# Patient Record
Sex: MALE | Race: AMERICAN INDIAN OR ALASKA NATIVE
[De-identification: names, ages, dates, MRNs, and addresses within clinical notes are randomized per-mention and may not be internally consistent; named-entity substitution may affect disease eponyms.]

---

## 2019-05-27 ENCOUNTER — HOSPITAL ENCOUNTER (INPATIENT)
Dept: HOSPITAL 5 - ED | Age: 74
LOS: 8 days | Discharge: HOSPICE | DRG: 54 | End: 2019-06-04
Attending: INTERNAL MEDICINE | Admitting: INTERNAL MEDICINE
Payer: MEDICARE

## 2019-05-27 DIAGNOSIS — I10: ICD-10-CM

## 2019-05-27 DIAGNOSIS — J44.9: ICD-10-CM

## 2019-05-27 DIAGNOSIS — G93.40: ICD-10-CM

## 2019-05-27 DIAGNOSIS — G81.91: ICD-10-CM

## 2019-05-27 DIAGNOSIS — C80.1: ICD-10-CM

## 2019-05-27 DIAGNOSIS — C79.31: Primary | ICD-10-CM

## 2019-05-27 DIAGNOSIS — Z82.49: ICD-10-CM

## 2019-05-27 DIAGNOSIS — G93.6: ICD-10-CM

## 2019-05-27 DIAGNOSIS — R91.1: ICD-10-CM

## 2019-05-27 DIAGNOSIS — D49.6: ICD-10-CM

## 2019-05-27 DIAGNOSIS — F17.200: ICD-10-CM

## 2019-05-27 DIAGNOSIS — F10.10: ICD-10-CM

## 2019-05-27 DIAGNOSIS — G40.89: ICD-10-CM

## 2019-05-27 DIAGNOSIS — M89.8X8: ICD-10-CM

## 2019-05-27 DIAGNOSIS — Z87.820: ICD-10-CM

## 2019-05-27 DIAGNOSIS — Z71.6: ICD-10-CM
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
## 2019-05-27 DIAGNOSIS — Z82.3: ICD-10-CM

## 2019-05-27 DIAGNOSIS — Z83.3: ICD-10-CM

## 2019-05-27 DIAGNOSIS — Z23: ICD-10-CM

## 2019-05-27 DIAGNOSIS — Z51.5: ICD-10-CM

## 2019-05-27 LAB
ANISOCYTOSIS BLD QL SMEAR: (no result)
APTT BLD: 36 SEC. (ref 24.2–36.6)
BAND NEUTROPHILS # (MANUAL): 0 K/MM3
BUN SERPL-MCNC: 14 MG/DL (ref 9–20)
BUN/CREAT SERPL: 18 %
CALCIUM SERPL-MCNC: 9.5 MG/DL (ref 8.4–10.2)
HCT VFR BLD CALC: 39.8 % (ref 35.5–45.6)
HEMOLYSIS INDEX: 51
HGB BLD-MCNC: 13.1 GM/DL (ref 11.8–15.2)
INR PPP: 1.06 (ref 0.87–1.13)
MCHC RBC AUTO-ENTMCNC: 33 % (ref 32–34)
MCV RBC AUTO: 81 FL (ref 84–94)
MYELOCYTES # (MANUAL): 0 K/MM3
PLATELET # BLD: 437 K/MM3 (ref 140–440)
PROMYELOCYTES # (MANUAL): 0 K/MM3
RBC # BLD AUTO: 4.92 M/MM3 (ref 3.65–5.03)
TOTAL CELLS COUNTED BLD: 100

## 2019-05-27 PROCEDURE — 90471 IMMUNIZATION ADMIN: CPT

## 2019-05-27 PROCEDURE — 90732 PPSV23 VACC 2 YRS+ SUBQ/IM: CPT

## 2019-05-27 PROCEDURE — 71046 X-RAY EXAM CHEST 2 VIEWS: CPT

## 2019-05-27 PROCEDURE — 80061 LIPID PANEL: CPT

## 2019-05-27 PROCEDURE — 85025 COMPLETE CBC W/AUTO DIFF WBC: CPT

## 2019-05-27 PROCEDURE — 85670 THROMBIN TIME PLASMA: CPT

## 2019-05-27 PROCEDURE — 93306 TTE W/DOPPLER COMPLETE: CPT

## 2019-05-27 PROCEDURE — 93880 EXTRACRANIAL BILAT STUDY: CPT

## 2019-05-27 PROCEDURE — G0008 ADMIN INFLUENZA VIRUS VAC: HCPCS

## 2019-05-27 PROCEDURE — 85730 THROMBOPLASTIN TIME PARTIAL: CPT

## 2019-05-27 PROCEDURE — 80048 BASIC METABOLIC PNL TOTAL CA: CPT

## 2019-05-27 PROCEDURE — 80053 COMPREHEN METABOLIC PANEL: CPT

## 2019-05-27 PROCEDURE — 85007 BL SMEAR W/DIFF WBC COUNT: CPT

## 2019-05-27 PROCEDURE — 87040 BLOOD CULTURE FOR BACTERIA: CPT

## 2019-05-27 PROCEDURE — 83036 HEMOGLOBIN GLYCOSYLATED A1C: CPT

## 2019-05-27 PROCEDURE — 93005 ELECTROCARDIOGRAM TRACING: CPT

## 2019-05-27 PROCEDURE — 90686 IIV4 VACC NO PRSV 0.5 ML IM: CPT

## 2019-05-27 PROCEDURE — A9577 INJ MULTIHANCE: HCPCS

## 2019-05-27 PROCEDURE — 70544 MR ANGIOGRAPHY HEAD W/O DYE: CPT

## 2019-05-27 PROCEDURE — G0009 ADMIN PNEUMOCOCCAL VACCINE: HCPCS

## 2019-05-27 PROCEDURE — 85610 PROTHROMBIN TIME: CPT

## 2019-05-27 PROCEDURE — 82378 CARCINOEMBRYONIC ANTIGEN: CPT

## 2019-05-27 PROCEDURE — 70551 MRI BRAIN STEM W/O DYE: CPT

## 2019-05-27 PROCEDURE — 99285 EMERGENCY DEPT VISIT HI MDM: CPT

## 2019-05-27 PROCEDURE — 93010 ELECTROCARDIOGRAM REPORT: CPT

## 2019-05-27 PROCEDURE — 70553 MRI BRAIN STEM W/O & W/DYE: CPT

## 2019-05-27 PROCEDURE — 86301 IMMUNOASSAY TUMOR CA 19-9: CPT

## 2019-05-27 PROCEDURE — 84484 ASSAY OF TROPONIN QUANT: CPT

## 2019-05-27 PROCEDURE — 82140 ASSAY OF AMMONIA: CPT

## 2019-05-27 PROCEDURE — 71260 CT THORAX DX C+: CPT

## 2019-05-27 PROCEDURE — 70450 CT HEAD/BRAIN W/O DYE: CPT

## 2019-05-27 PROCEDURE — 82962 GLUCOSE BLOOD TEST: CPT

## 2019-05-27 PROCEDURE — 3E0234Z INTRODUCTION OF SERUM, TOXOID AND VACCINE INTO MUSCLE, PERCUTANEOUS APPROACH: ICD-10-PCS | Performed by: INTERNAL MEDICINE

## 2019-05-27 PROCEDURE — 36415 COLL VENOUS BLD VENIPUNCTURE: CPT

## 2019-05-27 PROCEDURE — 96374 THER/PROPH/DIAG INJ IV PUSH: CPT

## 2019-05-27 RX ADMIN — DOCUSATE SODIUM SCH MG: 100 CAPSULE, LIQUID FILLED ORAL at 22:55

## 2019-05-27 RX ADMIN — ASPIRIN SCH MG: 325 TABLET ORAL at 10:17

## 2019-05-27 RX ADMIN — CEFTRIAXONE SODIUM SCH MLS/HR: 1 INJECTION, POWDER, FOR SOLUTION INTRAMUSCULAR; INTRAVENOUS at 10:17

## 2019-05-27 RX ADMIN — DOCUSATE SODIUM SCH MG: 100 CAPSULE, LIQUID FILLED ORAL at 10:17

## 2019-05-27 RX ADMIN — ENOXAPARIN SODIUM SCH MG: 100 INJECTION SUBCUTANEOUS at 10:17

## 2019-05-27 RX ADMIN — ENOXAPARIN SODIUM SCH MG: 100 INJECTION SUBCUTANEOUS at 22:55

## 2019-05-27 RX ADMIN — Medication SCH MG: at 17:55

## 2019-05-27 RX ADMIN — LEVETIRACETAM SCH MG: 500 TABLET, FILM COATED ORAL at 22:55

## 2019-05-27 RX ADMIN — Medication SCH ML: at 22:55

## 2019-05-27 RX ADMIN — Medication SCH ML: at 10:31

## 2019-05-27 NOTE — PROGRESS NOTE
Assessment and Plan


Assessment and plan: 


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 

dependency and alcohol abuse who presents to [facility] ED with c/o altered mental 

status and new right-sided weakness. Old deficits was loss of hear and abnormal 

speech but no motor deficits per nishweta Montgomery at bedside





* EKG: image reviewed (sinus rhythm 79 bpm)


* CT head without contrast Impression: Large area of hypoattenuation identified 

  in the upper left frontal and temporal lobes, this surrounds a few areas of 

  rounded increased attenuation in the high convexity of the posterior left 

  frontal and posterior left temporal lobes. The findings may indicate sequelae 

  from subacute to chronic infarction. The differential also includes primary 

  metastatic tumor in this region of the left cerebral hemisphere. Further 

  evaluation with advanced cross-sectional imaging utilizing MRI would be 

  appropriate. There is mild atrophy and slight periventricular deep white 

  matter change. There is no evidence of acute hemorrhage or hematoma. 


* 2v CXR impression: Left lung opacity concerning for a mass. Further evaluation

  with CT chest with contrast is recommended. 





Suspected Acute CVA with stroke in evolution vs Brain mets: treat with asa, 

statin, MRI brain pending 


Lung mass suspected Lung cancer with Mets: get CT chest with contrast and 

consulted Pulmonology


Hypertension: low salt diet


Leukocytosis: empiric iv rocephin


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


Hx of EtOH abuse


Hx of Tobacco dependency:  on stopping


DVT prophylaxis on Lovenox





d/w Gertrude x [phone] (niece) who works here, in our lab department


CT chest with contrast, brain MRI, carotid u/s and ECHO pending. 








History


Interval history: 


Patient was seen and examined. Follow-up on current diagnosis of suspected CVA. 

No overnight events reported to me. Patient is nonverbal. Niece Valentina at 

bedside. He has no spouse, no kids. His niece Gertrude, works here in the lab 

[phone]. Imaging, nursing note, chart, labs and old chart reviewed. Discussed with

patient. 








Gen: thin frail, NAD, Awake, Alert, not taking sensible


HEENT: NCAT, EOMI, PERRL, OP Clear with missing teeth and poor dentition


Neck: supple, no adenopathy, no thyromegaly, no JVD 


CVS/Heart: RRR, normal S1S2, pulses present bilaterally 


Chest/Lungs: CTA B, Symmetrical chest expansion, good air entry bilaterally 


GI/Abdomen: soft, NTND, good bowel sounds, no guarding or rebound 


/Bladder: no suprapubic tenderness, no CVA or paraspinal tenderness 


Extermity/Skin: no c/c/e, no obvious rash 


MSK: FROM x 4, 3/5 msk strength right arm and right leg


Neuro: CN 2-12 grossly intact except hearing and speech with new right 

hemiparesis. No drift 


Psych: calm 














Hospitalist Physical





- Constitutional


Vitals: 


                                        











Temp Pulse Resp BP Pulse Ox


 


 98.8 F   68   18   112/73   97 


 


 05/27/19 08:37  05/27/19 08:37  05/27/19 08:37  05/27/19 08:37  05/27/19 08:37














Results





- Labs


CBC & Chem 7: 


                                 05/27/19 00:58





                                 05/27/19 00:58


Labs: 


                             Laboratory Last Values











WBC  17.7 K/mm3 (4.5-11.0)  H  05/27/19  00:58    


 


RBC  4.92 M/mm3 (3.65-5.03)   05/27/19  00:58    


 


Hgb  13.1 gm/dl (11.8-15.2)   05/27/19  00:58    


 


Hct  39.8 % (35.5-45.6)   05/27/19  00:58    


 


MCV  81 fl (84-94)  L  05/27/19  00:58    


 


MCH  27 pg (28-32)  L  05/27/19  00:58    


 


MCHC  33 % (32-34)   05/27/19  00:58    


 


RDW  14.1 % (13.2-15.2)   05/27/19  00:58    


 


Plt Count  437 K/mm3 (140-440)   05/27/19  00:58    


 


Lymph #  Np   05/27/19  00:58    


 


Add Manual Diff  Complete   05/27/19  00:58    


 


Total Counted  100   05/27/19  00:58    


 


Seg Neuts % (Manual)  66.0 % (40.0-70.0)   05/27/19  00:58    


 


  0 %  05/27/19  00:58    


 


  25.0 % (13.4-35.0)   05/27/19  00:58    


 


Reactive Lymphs % (Man)  0 %  05/27/19  00:58    


 


  6.0 % (0.0-7.3)   05/27/19  00:58    


 


  3.0 % (0.0-4.3)   05/27/19  00:58    


 


  0 % (0.0-1.8)   05/27/19  00:58    


 


  0 %  05/27/19  00:58    


 


  0 %  05/27/19  00:58    


 


  0 %  05/27/19  00:58    


 


  0 %  05/27/19  00:58    


 


Nucleated RBC %  Not Reportable   05/27/19  00:58    


 


Seg Neutrophils # Man  11.7 K/mm3 (1.8-7.7)  H  05/27/19  00:58    


 


Band Neutrophils #  0.0 K/mm3  05/27/19  00:58    


 


  4.4 K/mm3 (1.2-5.4)   05/27/19  00:58    


 


Abs React Lymphs (Man)  0.0 K/mm3  05/27/19  00:58    


 


  1.1 K/mm3 (0.0-0.8)  H  05/27/19  00:58    


 


  0.5 K/mm3 (0.0-0.4)  H  05/27/19  00:58    


 


  0.0 K/mm3 (0.0-0.1)   05/27/19  00:58    


 


  0.0 K/mm3  05/27/19  00:58    


 


  0.0 K/mm3  05/27/19  00:58    


 


  0.0 K/mm3  05/27/19  00:58    


 


Blast Cells #  0.0 K/mm3  05/27/19  00:58    


 


WBC Morphology  Not Reportable   05/27/19  00:58    


 


WBC Morphology  TNR   05/27/19  00:58    


 


Hypersegmented Neuts  Not Reportable   05/27/19  00:58    


 


Hyposegmented Neuts  Not Reportable   05/27/19  00:58    


 


Hypogranular Neuts  Not Reportable   05/27/19  00:58    


 


  Not Reportable   05/27/19  00:58    


 


  Not Reportable   05/27/19  00:58    


 


  Not Reportable   05/27/19  00:58    


 


  Not Reportable   05/27/19  00:58    


 


  Not Reportable   05/27/19  00:58    


 


  Not Reportable   05/27/19  00:58    


 


  Consistent w auto   05/27/19  00:58    


 


  Not Reportable   05/27/19  00:58    


 


Plt Clumps, EDTA  Not Reportable   05/27/19  00:58    


 


  Not Reportable   05/27/19  00:58    


 


  Not Reportable   05/27/19  00:58    


 


  Not Reportable   05/27/19  00:58    


 


Plt Morphology Comment  Not Reportable   05/27/19  00:58    


 


RBC Morphology  Not Reportable   05/27/19  00:58    


 


Dimorphic RBCs  Not Reportable   05/27/19  00:58    


 


  Not Reportable   05/27/19  00:58    


 


  Not Reportable   05/27/19  00:58    


 


  Not Reportable   05/27/19  00:58    


 


  1+   05/27/19  00:58    


 


  Not Reportable   05/27/19  00:58    


 


  Not Reportable   05/27/19  00:58    


 


  Not Reportable   05/27/19  00:58    


 


  Not Reportable   05/27/19  00:58    


 


  Not Reportable   05/27/19  00:58    


 


  Not Reportable   05/27/19  00:58    


 


  Not Reportable   05/27/19  00:58    


 


  Not Reportable   05/27/19  00:58    


 


  Not Reportable   05/27/19  00:58    


 


  Not Reportable   05/27/19  00:58    


 


  Not Reportable   05/27/19  00:58    


 


  Not Reportable   05/27/19  00:58    


 


  Not Reportable   05/27/19  00:58    


 


  Not Reportable   05/27/19  00:58    


 


  Not Reportable   05/27/19  00:58    


 


Acanthocytes (Spur)  Not Reportable   05/27/19  00:58    


 


Rouleaux  Not Reportable   05/27/19  00:58    


 


  Not Reportable   05/27/19  00:58    


 


  Not Reportable   05/27/19  00:58    


 


  Not Reportable   05/27/19  00:58    


 


  Not Reportable   05/27/19  00:58    


 


Hem Pathologist Commnt  No   05/27/19  00:58    


 


PT  14.5 Sec. (12.2-14.9)   05/27/19  00:58    


 


INR  1.06  (0.87-1.13)   05/27/19  00:58    


 


APTT  36.0 Sec. (24.2-36.6)   05/27/19  00:58    


 


  16.6 Sec. (15.1-19.6)   05/27/19  00:58    


 


Sodium  140 mmol/L (137-145)   05/27/19  00:58    


 


Potassium  3.9 mmol/L (3.6-5.0)   05/27/19  00:58    


 


Chloride  101.9 mmol/L ()   05/27/19  00:58    


 


Carbon Dioxide  25 mmol/L (22-30)   05/27/19  00:58    


 


  17 mmol/L  05/27/19  00:58    


 


BUN  14 mg/dL (9-20)   05/27/19  00:58    


 


  0.8 mg/dL (0.8-1.5)   05/27/19  00:58    


 


Estimated GFR  > 60 ml/min  05/27/19  00:58    


 


  18 %  05/27/19  00:58    


 


Glucose  83 mg/dL ()   05/27/19  00:58    


 


POC Glucose  90  ()   05/27/19  08:43    


 


Lactic Acid  1.90 mmol/L (0.7-2.0)   05/27/19  04:35    


 


Calcium  9.5 mg/dL (8.4-10.2)   05/27/19  00:58    


 


  < 0.010 ng/mL (0.00-0.029)   05/27/19  00:58    














Active Medications





- Current Medications


Current Medications: 














Generic Name Dose Route Start Last Admin





  Trade Name Freq  PRN Reason Stop Dose Admin


 


Acetaminophen  650 mg  05/27/19 04:17 





  Tylenol  PO  





  Q4H PRN  





  Pain MILD(1-3)/Fever >100.5/HA  


 


Aspirin  325 mg  05/27/19 10:00  05/27/19 10:17





  Aspirin  PO   325 mg





  QDAY JOSE LUIS   Administration


 


Atorvastatin Calcium  40 mg  05/27/19 22:00 





  Lipitor  PO  





  QHS JOSE LUIS  


 


Docusate Sodium  100 mg  05/27/19 10:00  05/27/19 10:17





  Colace  PO   100 mg





  BID JOSE LUIS   Administration


 


Enoxaparin Sodium  40 mg  05/27/19 06:00  05/27/19 10:17





  Lovenox  SUB-Q   40 mg





  QDAY@2200 JOSE LUIS   Administration


 


Hydralazine HCl  10 mg  05/27/19 04:57 





  Apresoline  IV  





  Q4HR PRN  





  Blood Pressure  


 


Ceftriaxone Sodium  1 gm in 50 mls @ 100 mls/hr  05/27/19 06:16  05/27/19 10:37





  Rocephin/Ns 1 Gm/50 Ml  IV   100 mls/hr





  Q24HR JOSE LUIS   Infusion





  Protocol  


 


Ondansetron HCl  4 mg  05/27/19 04:17 





  Zofran  IV  





  Q8H PRN  





  Nausea And Vomiting  


 


Sodium Chloride  10 ml  05/27/19 10:00  05/27/19 10:31





  Sodium Chloride Flush Syringe 10 Ml  IV   10 ml





  BID JOSE LUIS   Administration


 


Sodium Chloride  10 ml  05/27/19 04:17 





  Sodium Chloride Flush Syringe 10 Ml  IV  





  PRN PRN  





  LINE FLUSH

## 2019-05-27 NOTE — EMERGENCY DEPARTMENT REPORT
ED Neuro Deficit HPI





- General


Chief Complaint: Neuro Symptoms/Deficit


Stated Complaint: POSSIBLE STROKE


Time Seen by Provider: 05/27/19 01:08


Source: patient


Mode of arrival: Wheelchair


Limitations: No Limitations





- History of Present Illness


Initial Comments: 





Patient is 73 years old male with no significant past medical history except for

alcohol abuse and a remote traumatic head injury in 2008 with intracranial bleed

that resulted in loss of hearing and confusion.  Patient brought to the 

emergency room by his niece who stated that patient not himself for the last 3-4

days and when she went to check on him she found that he has significant weakn

ess in his hands and leg and family stated that he has been falling a lot 

recently.  Patient is alert and oriented 3 and able to answer questions 

appropriately.  Patient denied any chest pain or shortness of breath.


-: days(s) (2)


Location: right arm, right leg


Presenting Symptoms: Present: Weak/Paralyzed One Side


Place: home





- Related Data


Home Medications: 


                                  Previous Rx's











 Medication  Instructions  Recorded  Last Taken  Type


 


HYDROcodone/APAP 5-325 [[place] 1 each PO Q6HR PRN #20 tablet 10/19/15 Unknown Rx





5/325]    











Allergies/Adverse Reactions: 


                                    Allergies











Allergy/AdvReac Type Severity Reaction Status Date / Time


 


No Known Allergies Allergy   Verified 10/19/15 19:57














ED Review of Systems


ROS: 


Stated complaint: POSSIBLE STROKE


Other details as noted in HPI





Comment: All other systems reviewed and negative


Constitutional: denies: chills, fever


Respiratory: denies: cough, shortness of breath, SOB with exertion


Cardiovascular: denies: chest pain, palpitations


Gastrointestinal: denies: abdominal pain, nausea, vomiting, diarrhea, 

constipation, hematemesis, melena, hematochezia


Musculoskeletal: denies: back pain


Neurological: weakness.  denies: headache





ED Past Medical Hx





- Past Medical History


Previous Medical History?: Yes


Additional medical history: TBI





- Surgical History


Past Surgical History?: No





- Social History


Smoking Status: Never Smoker


Substance Use Type: None





- Medications


Home Medications: 


                                Home Medications











 Medication  Instructions  Recorded  Confirmed  Last Taken  Type


 


HYDROcodone/APAP 5-325 [[place] 1 each PO Q6HR PRN #20 tablet 10/19/15  Unknown Rx





5/325]     














ED Neuro Physical Exam





- General


Limitations: No Limitations


General appearance: alert, in no apparent distress


Suspected Stroke: Yes





- Head


Head exam: Present: atraumatic, normocephalic, normal inspection





- Eye


Eye exam: Present: normal appearance, PERRL





- ENT


ENT exam: Present: normal exam, normal orophraynx, mucous membranes moist





- Neck


Neck exam: Present: normal inspection, full ROM.  Absent: tenderness, 

meningismus, lymphadenopathy, thyromegaly





- Respiratory


Respiratory exam: Present: normal lung sounds bilaterally





- Cardiovascular


Cardiovascular Exam: Present: regular rate, normal rhythm, normal heart sounds





- GI/Abdominal


GI/Abdominal exam: Present: soft, normal bowel sounds.  Absent: distended, 

tenderness, guarding, rebound, rigid, organomegaly, mass, bruit, pulsatile mass,

hernia





- Extremities Exam


Extremities exam: Present: normal inspection, full ROM, normal capillary refill





- Back Exam


Back exam: Present: normal inspection, full ROM.  Absent: tenderness, CVA 

tenderness (R), CVA tenderness (L), muscle spasm, paraspinal tenderness, 

vertebral tenderness





- Neurological Exam


Neurological exam: Present: alert, oriented X3, CN II-XII intact





- NIHSS


Assessment Interval: Baseline


1a. Level of Consciousness: alert/keenly responsive


1b. LOC Questions: answers both correctly


1c. LOC Commands: performs tasks correctly


2. Best Gaze: normal


3. Visual: no visual loss


4. Facial Palsy: normal symmetrical movement


5b. Motor Arm Right: drift


5a. Motor Arm Left: no drift


6a. Motor Leg Left: no drift


6b. Motor Leg Right: drift


7. Limb Ataxia: absent


8. Sensory: normal


9. Best Language: no aphasia


10. Dysarthria: normal


11. Extinction/Inattention: no abnormality


Total Score: 2


Stroke Severity: Minor Stroke





- Skin


Skin exam: Present: warm, intact, normal color





ED Course


                                   Vital Signs











  05/27/19





  00:16


 


Temperature 98.5 F


 


Pulse Rate 85


 


Respiratory 16





Rate 


 


Blood Pressure 141/73


 


O2 Sat by Pulse 99





Oximetry 














- Lab Data


Result diagrams: 


                                 05/27/19 00:58





                                 05/27/19 00:58


                                   Lab Results











  05/27/19 05/27/19 05/27/19 Range/Units





  00:58 00:58 00:58 


 


WBC  17.7 H    (4.5-11.0)  K/mm3


 


RBC  4.92    (3.65-5.03)  M/mm3


 


Hgb  13.1    (11.8-15.2)  gm/dl


 


Hct  39.8    (35.5-45.6)  %


 


MCV  81 L    (84-94)  fl


 


MCH  27 L    (28-32)  pg


 


MCHC  33    (32-34)  %


 


RDW  14.1    (13.2-15.2)  %


 


Plt Count  437    (140-440)  K/mm3


 


Lymph #  Np    


 


PT   14.5   (12.2-14.9)  Sec.


 


INR   1.06   (0.87-1.13)  


 


APTT   36.0   (24.2-36.6)  Sec.


 


Thrombin Time   16.6   (15.1-19.6)  Sec.


 


Sodium    140  (137-145)  mmol/L


 


Potassium    3.9  (3.6-5.0)  mmol/L


 


Chloride    101.9  ()  mmol/L


 


Carbon Dioxide    25  (22-30)  mmol/L


 


Anion Gap    17  mmol/L


 


BUN    14  (9-20)  mg/dL


 


Creatinine    0.8  (0.8-1.5)  mg/dL


 


Estimated GFR    > 60  ml/min


 


BUN/Creatinine Ratio    18  %


 


Glucose    83  ()  mg/dL


 


POC Glucose     ()  


 


Calcium    9.5  (8.4-10.2)  mg/dL


 


Troponin T    < 0.010  (0.00-0.029)  ng/mL














  05/27/19 Range/Units





  02:56 


 


WBC   (4.5-11.0)  K/mm3


 


RBC   (3.65-5.03)  M/mm3


 


Hgb   (11.8-15.2)  gm/dl


 


Hct   (35.5-45.6)  %


 


MCV   (84-94)  fl


 


MCH   (28-32)  pg


 


MCHC   (32-34)  %


 


RDW   (13.2-15.2)  %


 


Plt Count   (140-440)  K/mm3


 


Lymph #   


 


PT   (12.2-14.9)  Sec.


 


INR   (0.87-1.13)  


 


APTT   (24.2-36.6)  Sec.


 


Thrombin Time   (15.1-19.6)  Sec.


 


Sodium   (137-145)  mmol/L


 


Potassium   (3.6-5.0)  mmol/L


 


Chloride   ()  mmol/L


 


Carbon Dioxide   (22-30)  mmol/L


 


Anion Gap   mmol/L


 


BUN   (9-20)  mg/dL


 


Creatinine   (0.8-1.5)  mg/dL


 


Estimated GFR   ml/min


 


BUN/Creatinine Ratio   %


 


Glucose   ()  mg/dL


 


POC Glucose  92  ()  


 


Calcium   (8.4-10.2)  mg/dL


 


Troponin T   (0.00-0.029)  ng/mL














- EKG Data


-: EKG Interpreted by Me


EKG shows normal: sinus rhythm


Rate: normal


Interpretation: no acute changes





- Radiology Data


Radiology results: report reviewed





- Medical Decision Making





Patient is 73 years old male with no significant past medical history except for

alcohol abuse and a remote traumatic head injury in 2008 with intracranial bleed

that resulted in loss of hearing and confusion.  Patient brought to the 

emergency room by his niece who stated that patient not himself for the last 3-4

days and when she went to check on him she found that he has significant 

weakness in his hands and leg and family stated that he has been falling a lot 

recently.  Patient is alert and oriented 3 and able to answer questions 

appropriately.  Patient denied any chest pain or shortness of breath.





I discussed the patient is Dr. Dian Cisneros, she agreed to admit the patient for

stroke workup.


Critical care attestation.: 


If time is entered above; I have spent that time in minutes in the direct care 

of this critically ill patient, excluding procedure time.








ED Disposition


Clinical Impression: 


 CVA (cerebral vascular accident)





Disposition: DC-[id number] OP ADMIT IP TO THIS HOSP


Is pt being admited?: Yes


Condition: Stable

## 2019-05-27 NOTE — CONSULTATION
History of Present Illness


Consult date: 05/27/19


Requesting physician: JUANITO BLANCAS


Reason for Consult: weakness right side, abnormal head CT


Chief complaint: 


weakness right side, abnormal brain CT





History of present illness: 


This 73-year-old right-handed -American male (new goes by "Norberto") 2 days 

ago was noted to be not using his right hand as much as his left and dragging 

his right foot around 1 or 2 PM without any speech change.  His right hand was 

curved in terms of abnormal posture.  The same symptoms were seen Sunday.  There

is mention of falls.  He had had one episode of throwing up last  week, but no 

one knows what day that was.  He denies headache or dizziness.  He is hard of 

hearing since injury to his head in 2012.  CT scan shows on my review extensive 

edema in the left hemisphere with very slight left-to-right shift superiorly and

some hyperdensity in 3 distinct round areas, 2 in the frontal lobe on the left 

and 1 perhaps a bit and left temporal lobe.  Although it was read as likely 

being related to ischemic strokes, this to me appears more likely to be 

metastatic disease.  CT scan of chest shows a round lesion also on the left.  In

speaki so they may represent complex partial seizures.  From his nieces, I 

obtained a history of spacing out staring spells lasting 10-15 minutes for which

when called to it takes some 2 minutes for him to come out of it but without any

obvious postictal tiredness.  These have been occurring only in the past 1-2 

weeks.








Past History


Past Medical History: other (TBI, ICH 2008)


Past Surgical History: Other (pins and halo placed circumferentially around left

leg due to fracture in the past)


Social history: single, lives with family (with sister and niece), smoking 

(about one pack per day), alcohol abuse (occasional beer or occasional half pint

of hard liquor though he used to drink half a pint per day), other (worked as a 

 but retired prior to the 2012 injury).  denies: prescription drug abuse,

IV drug use


Family history: diabetes (mother), hypertension (paternal side, mother and 

brother), stroke (paternal uncle), other (negative for epilepsy or brain tumor)





Medications and Allergies


                                    Allergies











Allergy/AdvReac Type Severity Reaction Status Date / Time


 


No Known Allergies Allergy   Verified 10/19/15 19:57











Active Meds: 


Active Medications





Acetaminophen (Tylenol)  650 mg PO Q4H PRN


   PRN Reason: Pain MILD(1-3)/Fever >100.5/HA


Aspirin (Aspirin)  325 mg PO QDAY [facility]


   Last Admin: 05/27/19 10:17 Dose:  325 mg


   Documented by: 


Atorvastatin Calcium (Lipitor)  40 mg PO QHS [facility]


Docusate Sodium (Colace)  100 mg PO BID [facility]


   Last Admin: 05/27/19 10:17 Dose:  100 mg


   Documented by: 


Enoxaparin Sodium (Lovenox)  40 mg SUB-Q QDAY@2200 [facility]


   Last Admin: 05/27/19 10:17 Dose:  40 mg


   Documented by: 


Hydralazine HCl (Apresoline)  10 mg IV Q4HR PRN


   PRN Reason: Blood Pressure


Ceftriaxone Sodium (Rocephin/Ns 1 Gm/50 Ml)  1 gm in 50 mls @ 100 mls/hr IV 

Q24HR [facility]; Protocol


   Last Infusion: 05/27/19 10:37 Dose:  100 mls/hr


   Documented by: 


Ondansetron HCl (Zofran)  4 mg IV Q8H PRN


   PRN Reason: Nausea And Vomiting


Sodium Chloride (Sodium Chloride Flush Syringe 10 Ml)  10 ml IV BID [facility]


   Last Admin: 05/27/19 10:31 Dose:  10 ml


   Documented by: 


Sodium Chloride (Sodium Chloride Flush Syringe 10 Ml)  10 ml IV PRN PRN


   PRN Reason: LINE FLUSH











Review of Systems


All systems: negative (no headaches or dizziness.  Unknown if he snores but 

according to his nieces he has been sleeping a lot in the past 2 weeks.  

Problems with short-term and remote memory since 2012 injury.)





Physical Examination





- Vital Signs


Vital Signs: 


                                   Vital Signs











Temp Pulse Resp BP Pulse Ox


 


 98.5 F   85   16   141/73   99 


 


 05/27/19 00:16  05/27/19 00:16  05/27/19 00:16  05/27/19 00:16  05/27/19 00:16














- Physical Exam


Narrative exam: 


General Appearance: well developed well nourished (per BMI).  Mid 70s -

American male in NAD, seen with his two nieces.





HEENT: atraumatic, normocephalic; no bruits, 2+ Delia without soreness or 

induration or enlargement, sclerae nonicteric.  Oropharynx pink and moist, 

edentulous. 


Neck: supple, no bruits.  


Heart: no murmur but sounds are distant.


Extremities: no clubbing, cyanosis or edema.  2+ pedis pulses bilaterally.





Neurologic Exam:


Mental Status: Awake, alert, oriented to season being "fall", cannot give day of

week, gives year after 2000 prompt as "9", cannot give president or vice 

president after usual prompts.  Speech is clear, names pen and ink instead of 

tip of pen but gives calm and its teeth, abstracts well.  According to his 

nieces, usually not able to answer these items.  Cannot do serial sevens 7's, 

has right-left confusion, cannot cooperate to try 3 of 3 objects at 3 minutes, 

cannot spell WORLD or CATCH.


Cranial Nerves: fields full, no papilledema, SVPs present, PERRLA, EOMs full 

without nystagmus or diplopia, facial sensation intact to pinprick and light 

touch, no facial weakness, Messer is midline, palate rises symmetrically to 

phonation and gags are positive, shoulder shrug is 0 right and 5 left, tongue 

protrudes to the left.  


Cerebellar: finger to nose and heel to shin are intact.  


Sensory: intact to light touch, pinprick, and vibrations.  Double simultaneous s

timulation shows extinction right hand.  


Motor Exam Upper Extremities: no drift but right pronation, Rose Marie slightly slow. 

  are 5 X 2, tone is normal.  No atrophy or fasciculations are noted vis

ually. 


Motor Exam Lower Extremities: mild to moderate right leg lag; iliopsoas, 

quadriceps and anterior tibials and gastrocnemius are 5 left and 5- right.  Rose Marie

are slightly slow X 2.  Tone is normal.  No atrophy or fasciculations are noted 

visually. 


Reflexes: Palmomental is strongly positive right, snout is negative and jaw jerk

is slightly positive.  Triceps, biceps and brachioradialis are 1 bilaterally.  

Ami's is negative bilaterally.  Knee jerks are 2+ right and trace to 1 left

and ankle jerks are 0 bilaterally even with reinforcement and without clonus.  

Toes are mute right and slightly downgoing left to Babinski testing.














Results





- Laboratory Findings


CBC and BMP: 


                                 05/28/19 05:41





                                 05/28/19 05:41


Abnormal Lab Findings: 


                                  Abnormal Labs











  05/27/19





  00:58


 


WBC  17.7 H


 


MCV  81 L


 


MCH  27 L


 


Seg Neutrophils # Man  11.7 H


 


Monocytes # (Manual)  1.1 H


 


Eosinophils # (Manual)  0.5 H














Assessment and Plan


Impression:


1.  Abnormal brain CT scan


2.  Complex partial seizures, nonintractable


3.  Right hemiparesis





Plan:


1.  Changed brain MRI order to be with and without contrast instead of 

noncontrast alone (alerted Dr. Rios about this).  I showed him some.  Images of 

the brain CT showing them the lesions and the surrounding edema.


2.  To get chest CT via Dr. Rios.


3.  I explained to the family that the course of action will depend on the 

results of the brain MRI.  I suspect the oncologist may rely on a biopsy of the 

chest lesion and not require a brain biopsy in order to proceed with treatment 

for both areas, perhaps chemotherapy therapy plus radiation depending on the 

pathologic findings.  I told them this might mainly be done as an outpatient.  

If MRI shows the lesions are strokes, I explained there will be a different co

urse of action for the brain but the chest lesion may still be a tumor.


4.  Will start him on levetiracetam.





70 min spent including review of multiple CT images (some of which I reviewed in

detail with his family) and review of course of action depending on brain MRI 

review.  





Thank you for an interesting consultation on this unfortunate mid 70's male.  

Will sign off but would see him again if need be, if MRI shows strokes or if he 

has problems with tolerating levetiracetam.  Will add B6 200 mg a day to help 

prevent irritability from levetiracetam.





Addendum 5/28/19:  MRA appears ok grossly.  MRI shows multiple metastatic 

lesions enhancing, with newly seen ones in far posterior left occipital lobe and

medial anterior left occipital lobe, with some hemorrhagic components in a few 

places.  Await official report.  Suggest stopping aspirin.

## 2019-05-27 NOTE — HISTORY AND PHYSICAL REPORT
<JUANITO BLANCAS - Last Filed: 05/27/19 04:58>





History of Present Illness


Date of examination: 05/27/19


Date of admission: 


05/27/2019


Chief complaint: 





Ultimate mental status, right sided weakness


History of present illness: 





73-year-old -American male with history of ICH in 2008 secondary to TBI, 

alcohol abuse who presents to [facility] ED with c/o altered mental status and right-

sided weakness.  Patient was brought in to the ED by his niece for concerns of 

altered mental status and right-sided weakness with the past 3-4 days.  Patient 

states that she went to check on him and found him with profound right upper 

extremity weakness and altered mental status.  She also states patient has been 

experiencing frequent falls at home. 





Admits to right-sided weakness





Denies pain, fever, headache, visual disturbances, nausea, vomiting, dyspnea, 

and diaphoresis





Past History


Past Medical History: other (TBI, ICH 2008)





Medications and Allergies


                                    Allergies











Allergy/AdvReac Type Severity Reaction Status Date / Time


 


No Known Allergies Allergy   Verified 10/19/15 19:57











                                Home Medications











 Medication  Instructions  Recorded  Confirmed  Last Taken  Type


 


HYDROcodone/APAP 5-325 [Duck 1 each PO Q6HR PRN #20 tablet 10/19/15  Unknown Rx





5/325]     











Active Meds: 


Active Medications





Acetaminophen (Tylenol)  650 mg PO Q4H PRN


   PRN Reason: Pain MILD(1-3)/Fever >100.5/HA


Aspirin (Aspirin)  325 mg PO QDAY JOSE LUIS


Atorvastatin Calcium (Lipitor)  40 mg PO QHS JOSE LUIS


Docusate Sodium (Colace)  100 mg PO BID JOSE LUIS


Enoxaparin Sodium (Lovenox)  40 mg SUB-Q QDAY@2200 JOSE LUIS


Ondansetron HCl (Zofran)  4 mg IV Q8H PRN


   PRN Reason: Nausea And Vomiting


Sodium Chloride (Sodium Chloride Flush Syringe 10 Ml)  10 ml IV BID JOSE LUIS


Sodium Chloride (Sodium Chloride Flush Syringe 10 Ml)  10 ml IV PRN PRN


   PRN Reason: LINE FLUSH











Review of Systems


All systems: negative (no additional remarkable complaints except as noted 

below)


Musculoskeletal: other (right-sided weakness)


Neurological: transient paralysis (right upper extremity), weakness (right-

sided), change in mentation





Exam





- Physical Exam


Narrative exam: 





Physical exam





General appearance: Present: No acute distress, awake, oriented to self, place, 

situation, older adult male





- EENT


Eyes: Present: PERRL, EOM intact


ENT: hard of hearing , missing teeth 





- Neck


Neck: Present: supple, normal ROM





- Respiratory


Respiratory effort: Non-labored


Respiratory: Clear





- Cardiovascular


Heart rate:  79 (bpm)


Rhythm: Sinus rhythm regular


Heart Sounds: Present: S1 & S2.  Absent: rub, click





- Extremities


Extremities: no ischemia, pulses intact, 





- Peripheral Assessment


Peripheral Pulses: within normal limits


 


- Abdominal


General gastrointestinal: soft, non-tender, normal bowel sounds





- Integumentary


Integumentary: Present: warm, dry





- Musculoskeletal


Musculoskeletal: right upper and lower extremity weakness)generalized weakness





- Psychiatric


Psychiatric: cooperative





- Constitutional


Vitals: 


                                        











Temp Pulse Resp BP Pulse Ox


 


 98.5 F   85   16   141/73   99 


 


 05/27/19 00:16  05/27/19 00:16  05/27/19 00:16  05/27/19 00:16  05/27/19 00:16














Results





- Labs


CBC & Chem 7: 


                                 05/27/19 00:58





                                 05/27/19 00:58


Labs: 


                             Laboratory Last Values











WBC  17.7 K/mm3 (4.5-11.0)  H  05/27/19  00:58    


 


RBC  4.92 M/mm3 (3.65-5.03)   05/27/19  00:58    


 


Hgb  13.1 gm/dl (11.8-15.2)   05/27/19  00:58    


 


Hct  39.8 % (35.5-45.6)   05/27/19  00:58    


 


MCV  81 fl (84-94)  L  05/27/19  00:58    


 


MCH  27 pg (28-32)  L  05/27/19  00:58    


 


MCHC  33 % (32-34)   05/27/19  00:58    


 


RDW  14.1 % (13.2-15.2)   05/27/19  00:58    


 


Plt Count  437 K/mm3 (140-440)   05/27/19  00:58    


 


Lymph #  Np   05/27/19  00:58    


 


Add Manual Diff  Complete   05/27/19  00:58    


 


Total Counted  100   05/27/19  00:58    


 


Seg Neuts % (Manual)  66.0 % (40.0-70.0)   05/27/19  00:58    


 


  0 %  05/27/19  00:58    


 


  25.0 % (13.4-35.0)   05/27/19  00:58    


 


Reactive Lymphs % (Man)  0 %  05/27/19  00:58    


 


  6.0 % (0.0-7.3)   05/27/19  00:58    


 


  3.0 % (0.0-4.3)   05/27/19  00:58    


 


  0 % (0.0-1.8)   05/27/19  00:58    


 


  0 %  05/27/19  00:58    


 


  0 %  05/27/19  00:58    


 


  0 %  05/27/19  00:58    


 


  0 %  05/27/19  00:58    


 


Nucleated RBC %  Not Reportable   05/27/19  00:58    


 


Seg Neutrophils # Man  11.7 K/mm3 (1.8-7.7)  H  05/27/19  00:58    


 


Band Neutrophils #  0.0 K/mm3  05/27/19  00:58    


 


  4.4 K/mm3 (1.2-5.4)   05/27/19  00:58    


 


Abs React Lymphs (Man)  0.0 K/mm3  05/27/19  00:58    


 


  1.1 K/mm3 (0.0-0.8)  H  05/27/19  00:58    


 


  0.5 K/mm3 (0.0-0.4)  H  05/27/19  00:58    


 


  0.0 K/mm3 (0.0-0.1)   05/27/19  00:58    


 


  0.0 K/mm3  05/27/19  00:58    


 


  0.0 K/mm3  05/27/19  00:58    


 


  0.0 K/mm3  05/27/19  00:58    


 


Blast Cells #  0.0 K/mm3  05/27/19  00:58    


 


WBC Morphology  Not Reportable   05/27/19  00:58    


 


WBC Morphology  TNR   05/27/19  00:58    


 


Hypersegmented Neuts  Not Reportable   05/27/19  00:58    


 


Hyposegmented Neuts  Not Reportable   05/27/19  00:58    


 


Hypogranular Neuts  Not Reportable   05/27/19  00:58    


 


  Not Reportable   05/27/19  00:58    


 


  Not Reportable   05/27/19  00:58    


 


  Not Reportable   05/27/19  00:58    


 


  Not Reportable   05/27/19  00:58    


 


  Not Reportable   05/27/19  00:58    


 


  Not Reportable   05/27/19  00:58    


 


  Consistent w auto   05/27/19  00:58    


 


  Not Reportable   05/27/19  00:58    


 


Plt Clumps, EDTA  Not Reportable   05/27/19  00:58    


 


  Not Reportable   05/27/19  00:58    


 


  Not Reportable   05/27/19  00:58    


 


  Not Reportable   05/27/19  00:58    


 


Plt Morphology Comment  Not Reportable   05/27/19  00:58    


 


RBC Morphology  Not Reportable   05/27/19  00:58    


 


Dimorphic RBCs  Not Reportable   05/27/19  00:58    


 


  Not Reportable   05/27/19  00:58    


 


  Not Reportable   05/27/19  00:58    


 


  Not Reportable   05/27/19  00:58    


 


  1+   05/27/19  00:58    


 


  Not Reportable   05/27/19  00:58    


 


  Not Reportable   05/27/19  00:58    


 


  Not Reportable   05/27/19  00:58    


 


  Not Reportable   05/27/19  00:58    


 


  Not Reportable   05/27/19  00:58    


 


  Not Reportable   05/27/19  00:58    


 


  Not Reportable   05/27/19  00:58    


 


  Not Reportable   05/27/19  00:58    


 


  Not Reportable   05/27/19  00:58    


 


  Not Reportable   05/27/19  00:58    


 


  Not Reportable   05/27/19  00:58    


 


  Not Reportable   05/27/19  00:58    


 


  Not Reportable   05/27/19  00:58    


 


  Not Reportable   05/27/19  00:58    


 


  Not Reportable   05/27/19  00:58    


 


Acanthocytes (Spur)  Not Reportable   05/27/19  00:58    


 


Rouleaux  Not Reportable   05/27/19  00:58    


 


  Not Reportable   05/27/19  00:58    


 


  Not Reportable   05/27/19  00:58    


 


  Not Reportable   05/27/19  00:58    


 


  Not Reportable   05/27/19  00:58    


 


Hem Pathologist Commnt  No   05/27/19  00:58    


 


PT  14.5 Sec. (12.2-14.9)   05/27/19  00:58    


 


INR  1.06  (0.87-1.13)   05/27/19  00:58    


 


APTT  36.0 Sec. (24.2-36.6)   05/27/19  00:58    


 


  16.6 Sec. (15.1-19.6)   05/27/19  00:58    


 


Sodium  140 mmol/L (137-145)   05/27/19  00:58    


 


Potassium  3.9 mmol/L (3.6-5.0)   05/27/19  00:58    


 


Chloride  101.9 mmol/L ()   05/27/19  00:58    


 


Carbon Dioxide  25 mmol/L (22-30)   05/27/19  00:58    


 


  17 mmol/L  05/27/19  00:58    


 


BUN  14 mg/dL (9-20)   05/27/19  00:58    


 


  0.8 mg/dL (0.8-1.5)   05/27/19  00:58    


 


Estimated GFR  > 60 ml/min  05/27/19  00:58    


 


  18 %  05/27/19  00:58    


 


Glucose  83 mg/dL ()   05/27/19  00:58    


 


POC Glucose  92  ()   05/27/19  02:56    


 


Calcium  9.5 mg/dL (8.4-10.2)   05/27/19  00:58    


 


  < 0.010 ng/mL (0.00-0.029)   05/27/19  00:58    














- Imaging and Cardiology


EKG: image reviewed (sinus rhythm 79 bpm)


CT Scan - head: report reviewed ( Large area of hypoattenuation identified in 

the upper left frontal and temporal lobes,this surrounds a few areas of rounded 

increased attenuation in the high convexity of the posterior left frontal and 

posterior left temporal lobes.There is mild atrophy and slight periventricular 

deep white matter change. There is no evidence of acute hemorrhage or hematoma. 

), image reviewed





Assessment and Plan


Assessment and plan: 





73-year-old -American male with history of ICH in 2008 secondary to TBI, 

alcohol abuse who presents to [facility] ED with c/o altered mental status and right-

sided weakness.  Patient was brought in to the ED by his niece for concerns of 

altered mental status and right-sided weakness for the past 3-4 days.  At the 

time of examination patient is able to move upper and lower extremities against 

gravity; unable to move RLE against resistance.  Patient is alert and oriented 

to self, place, situation.  Disoriented to time patient states that he is 54 

years old.  He is able to provide correct month, day, and year of birth  but st

ates incorrect age. CT Head unrevealing for acute intracranial hemorrhage or 

hematoma. Will Admit to telemetry unit for further evaluation and treatment.





Suspicion of CVA


Hypertension


Leukocytosis


Hx of TBI (2008)


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


Hx of EtOH abuse





Plan:


Continue Supportive Care


Initiate Stroke protocol


MRI/MRA Head, and carotid duplex pending


Neurology consult pending


Monitor BP


IV hydralazine when necessary


WBC 17.7 on admission, patient is afebrile, lactic acid pending


Monitor WBC if lactic acid elevated will consider empiric abx coverage


ASA, Statin


DVT PPX on Lovenox and SCD's


Advance Directives: No


VTE prophylaxis?: Chemical


Plan of care discussed with patient/family: Yes





<KIYA COLON - Last Filed: 05/27/19 06:19>





History of Present Illness


Date of admission: 


05/27/19 04:17








Medications and Allergies


Active Meds: 


Active Medications





Acetaminophen (Tylenol)  650 mg PO Q4H PRN


   PRN Reason: Pain MILD(1-3)/Fever >100.5/HA


Aspirin (Aspirin)  325 mg PO QDAY JOSE LUIS


Atorvastatin Calcium (Lipitor)  40 mg PO QHS JOSE LUIS


Docusate Sodium (Colace)  100 mg PO BID JOSE LUIS


Enoxaparin Sodium (Lovenox)  40 mg SUB-Q QDAY@2200 JOSE LUIS


Hydralazine HCl (Apresoline)  10 mg IV Q4HR PRN


   PRN Reason: Blood Pressure


Ceftriaxone Sodium (Rocephin/Ns 1 Gm/50 Ml)  1 gm in 50 mls @ 100 mls/hr IV 

Q24HR JOSE LUIS; Protocol


Ondansetron HCl (Zofran)  4 mg IV Q8H PRN


   PRN Reason: Nausea And Vomiting


Sodium Chloride (Sodium Chloride Flush Syringe 10 Ml)  10 ml IV BID JOSE LUIS


Sodium Chloride (Sodium Chloride Flush Syringe 10 Ml)  10 ml IV PRN PRN


   PRN Reason: LINE FLUSH











Exam





- Constitutional


Vitals: 


                                        











Temp Pulse Resp BP Pulse Ox


 


 98.5 F   69   15   120/64   97 


 


 05/27/19 00:16  05/27/19 06:00  05/27/19 06:00  05/27/19 06:00  05/27/19 06:00














Results





- Labs


CBC & Chem 7: 


                                 05/27/19 00:58





                                 05/27/19 00:58


Labs: 


                             Laboratory Last Values











WBC  17.7 K/mm3 (4.5-11.0)  H  05/27/19  00:58    


 


RBC  4.92 M/mm3 (3.65-5.03)   05/27/19  00:58    


 


Hgb  13.1 gm/dl (11.8-15.2)   05/27/19  00:58    


 


Hct  39.8 % (35.5-45.6)   05/27/19  00:58    


 


MCV  81 fl (84-94)  L  05/27/19  00:58    


 


MCH  27 pg (28-32)  L  05/27/19  00:58    


 


MCHC  33 % (32-34)   05/27/19  00:58    


 


RDW  14.1 % (13.2-15.2)   05/27/19  00:58    


 


Plt Count  437 K/mm3 (140-440)   05/27/19  00:58    


 


Lymph #  Np   05/27/19  00:58    


 


Add Manual Diff  Complete   05/27/19  00:58    


 


Total Counted  100   05/27/19  00:58    


 


Seg Neuts % (Manual)  66.0 % (40.0-70.0)   05/27/19  00:58    


 


  0 %  05/27/19  00:58    


 


  25.0 % (13.4-35.0)   05/27/19  00:58    


 


Reactive Lymphs % (Man)  0 %  05/27/19  00:58    


 


  6.0 % (0.0-7.3)   05/27/19  00:58    


 


  3.0 % (0.0-4.3)   05/27/19  00:58    


 


  0 % (0.0-1.8)   05/27/19  00:58    


 


  0 %  05/27/19  00:58    


 


  0 %  05/27/19  00:58    


 


  0 %  05/27/19  00:58    


 


  0 %  05/27/19  00:58    


 


Nucleated RBC %  Not Reportable   05/27/19  00:58    


 


Seg Neutrophils # Man  11.7 K/mm3 (1.8-7.7)  H  05/27/19  00:58    


 


Band Neutrophils #  0.0 K/mm3  05/27/19  00:58    


 


  4.4 K/mm3 (1.2-5.4)   05/27/19  00:58    


 


Abs React Lymphs (Man)  0.0 K/mm3  05/27/19  00:58    


 


  1.1 K/mm3 (0.0-0.8)  H  05/27/19  00:58    


 


  0.5 K/mm3 (0.0-0.4)  H  05/27/19  00:58    


 


  0.0 K/mm3 (0.0-0.1)   05/27/19  00:58    


 


  0.0 K/mm3  05/27/19  00:58    


 


  0.0 K/mm3  05/27/19  00:58    


 


  0.0 K/mm3  05/27/19  00:58    


 


Blast Cells #  0.0 K/mm3  05/27/19  00:58    


 


WBC Morphology  Not Reportable   05/27/19  00:58    


 


WBC Morphology  TNR   05/27/19  00:58    


 


Hypersegmented Neuts  Not Reportable   05/27/19  00:58    


 


Hyposegmented Neuts  Not Reportable   05/27/19  00:58    


 


Hypogranular Neuts  Not Reportable   05/27/19  00:58    


 


  Not Reportable   05/27/19  00:58    


 


  Not Reportable   05/27/19  00:58    


 


  Not Reportable   05/27/19  00:58    


 


  Not Reportable   05/27/19  00:58    


 


  Not Reportable   05/27/19  00:58    


 


  Not Reportable   05/27/19  00:58    


 


  Consistent w auto   05/27/19  00:58    


 


  Not Reportable   05/27/19  00:58    


 


Plt Clumps, EDTA  Not Reportable   05/27/19  00:58    


 


  Not Reportable   05/27/19  00:58    


 


  Not Reportable   05/27/19  00:58    


 


  Not Reportable   05/27/19  00:58    


 


Plt Morphology Comment  Not Reportable   05/27/19  00:58    


 


RBC Morphology  Not Reportable   05/27/19  00:58    


 


Dimorphic RBCs  Not Reportable   05/27/19  00:58    


 


  Not Reportable   05/27/19  00:58    


 


  Not Reportable   05/27/19  00:58    


 


  Not Reportable   05/27/19  00:58    


 


  1+   05/27/19  00:58    


 


  Not Reportable   05/27/19  00:58    


 


  Not Reportable   05/27/19  00:58    


 


  Not Reportable   05/27/19  00:58    


 


  Not Reportable   05/27/19  00:58    


 


  Not Reportable   05/27/19  00:58    


 


  Not Reportable   05/27/19  00:58    


 


  Not Reportable   05/27/19  00:58    


 


  Not Reportable   05/27/19  00:58    


 


  Not Reportable   05/27/19  00:58    


 


  Not Reportable   05/27/19  00:58    


 


  Not Reportable   05/27/19  00:58    


 


  Not Reportable   05/27/19  00:58    


 


  Not Reportable   05/27/19  00:58    


 


  Not Reportable   05/27/19  00:58    


 


  Not Reportable   05/27/19  00:58    


 


Acanthocytes (Spur)  Not Reportable   05/27/19  00:58    


 


Rouleaux  Not Reportable   05/27/19  00:58    


 


  Not Reportable   05/27/19  00:58    


 


  Not Reportable   05/27/19  00:58    


 


  Not Reportable   05/27/19  00:58    


 


  Not Reportable   05/27/19  00:58    


 


Hem Pathologist Commnt  No   05/27/19  00:58    


 


PT  14.5 Sec. (12.2-14.9)   05/27/19  00:58    


 


INR  1.06  (0.87-1.13)   05/27/19  00:58    


 


APTT  36.0 Sec. (24.2-36.6)   05/27/19  00:58    


 


  16.6 Sec. (15.1-19.6)   05/27/19  00:58    


 


Sodium  140 mmol/L (137-145)   05/27/19  00:58    


 


Potassium  3.9 mmol/L (3.6-5.0)   05/27/19  00:58    


 


Chloride  101.9 mmol/L ()   05/27/19  00:58    


 


Carbon Dioxide  25 mmol/L (22-30)   05/27/19  00:58    


 


  17 mmol/L  05/27/19  00:58    


 


BUN  14 mg/dL (9-20)   05/27/19  00:58    


 


  0.8 mg/dL (0.8-1.5)   05/27/19  00:58    


 


Estimated GFR  > 60 ml/min  05/27/19  00:58    


 


  18 %  05/27/19  00:58    


 


Glucose  83 mg/dL ()   05/27/19  00:58    


 


POC Glucose  92  ()   05/27/19  02:56    


 


Lactic Acid  1.90 mmol/L (0.7-2.0)   05/27/19  04:35    


 


Calcium  9.5 mg/dL (8.4-10.2)   05/27/19  00:58    


 


  < 0.010 ng/mL (0.00-0.029)   05/27/19  00:58    














Assessment and Plan


Assessment and plan: 


73-year-old man with a history of TBI and admitted for evaluation of right-sided

weakness, he is also hearing impaired.  Agree with plan as stated above except 

DC carotid Doppler. SIRS, obtain chest x-ray, urinalysis, blood cultures, start 

empiric Rocephin

## 2019-05-27 NOTE — VASCULAR LAB REPORT
PROCEDURE: VL CAROTID DUPLEX BILAT 

 

TECHNIQUE: Carotid ultrasound. Degree of carotid stenosis calculated by indirect methods via the peak
 systolic velocities of the ICA and CCA and reference with the society of Radiologist and Ultrasound 
consensus conference radiology 2003. 

 

HISTORY: stroke 

 

COMPARISONS: None currently available. 

 

FINDINGS: 

Note: Measurement of carotid stenosis is based on flow velocity values that correlate with the [place] 
[place] Symptomatic Carotid Endarterectomy Trial (NASCET) based stenosis criteria using the internal
 carotid artery diameter as the denominator for stenosis calculation. 

 

RIGHT CCA, ICA, and ECA (cm/s):  111, 70, and 69.. Ratio = 0.63. 

LEFT CCA, ICA, and ECA (cm/s):  98, 70, and 95. Ratio = 0.71. 

 

There is plaque in both carotids. 

Both vertebral arteries demonstrate antegrade flow. 

Normal spectral rhythm is identified. 

 

IMPRESSION: 

*  No hemodynamically significant (>50%) stenosis noted based on the ratios, velocities, and color Do
ppler images. 

 

This document is electronically signed by Obinna Fink MD., May 27 2019 04:27:22 PM ET

## 2019-05-27 NOTE — CAT SCAN REPORT
PROCEDURE:  CT HEAD/BRAIN WO CON 

 

TECHNIQUE:  Computerized tomography of the head was performed without contrast material.  

 

 

HISTORY:  rt sided weakness  

 

COMPARISONS:  None . 

 

FINDINGS: 

 

Skull and scalp:  Normal . 

Paranasal sinuses:  Normal . 

Ventricles and subarachnoid spaces:  Normal . 

Cerebrum:  There is a large area of hypoattenuation in the posterior left frontal and temporal lobes,
 this is surrounding a few areas of rounded increased attenuation in the high convexly of the posteri
or left frontal lobe and the posterior left temporal lobe. These rounded areas measure 1.2 x 1.1 cm i
n the anterior upper left frontal lobe. A second in the upper left frontal lobe measures 2.3 x 1.8 cm
. A third in the posterior left temporal lobe measures 1.8 x 1.6 cm. The findings may indicate sequel
ae from a subacute to chronic infarction. The differential also includes primary or metastatic tumor 
in this region of the left cerebral hemisphere. No prior studies are available for review. Further in
vestigation with MRI may be appropriate. There is mild atrophy and slight periventricular deep white 
matter change. No acute hemorrhage or hematoma is seen. . 

Cerebellum and brainstem:  No evidence of hemorrhage, acute infarction or mass . 

Vasculature:  Normal . 

Other:  None . 

ASPECTS: 10 

 

IMPRESSION:  Large area of hypoattenuation identified in the upper left frontal and temporal lobes, t
his surrounds a few areas of rounded increased attenuation in the high convexity of the posterior lef
t frontal and posterior left temporal lobes. The findings may indicate sequelae from  subacute to chr
onic infarction. The differential also includes primary metastatic tumor in this region of the left c
erebral hemisphere. Further evaluation with advanced cross-sectional imaging utilizing MRI would be a
ppropriate. 

 

There is mild atrophy and slight periventricular deep white matter change. There is no evidence of ac
trevor hemorrhage or hematoma. . 

 

 

 

The above findings are discussed with the patient's ER physician Dr. Lawrence at the time of dictation
 0332 Eastern standard time on 5/27/2019 

 

This document is electronically signed by Charlotte Valdez DO., May 27 2019 03:33:00 AM ET

## 2019-05-27 NOTE — CAT SCAN REPORT
PROCEDURE: CT chest with contrast. 

 

TECHNIQUE:  Computerized axial tomography of the chest was performed during the IV injection of iodin
ated nonionic contrast.  

 

CT DOSE LENGTH PRODUCT:  413.9  mGycm 

 

HISTORY: Lung mass, possible cancer . 

 

COMPARISONS:  None. 

 

FINDINGS: 

 

The trachea and central bronchi appear normal. There are small cystic changes in both upper lobes sug
gesting emphysema. The right lung is grossly clear. There is a solid, slightly lobulated mass in the 
midportion of the left upper lobe. This measures 3.0 cm x 2.8 cm in cross-section. It is worrisome fo
r a primary lung malignancy or a solitary metastasis. Further evaluation is recommended. There is a s
mall amount of alveolar opacity in the dependent portion of the left lower lobe. This could represent
 pneumonia or subsegmental atelectasis. There are no pleural effusions. The thoracic aorta has a norm
al caliber without evidence of dissection. The central pulmonary arteries enhance normally. There is 
no definite mediastinal adenopathy. The heart size is normal. The adrenal glands are not enlarged. Th
e thoracic skeleton appears intact. 

 

IMPRESSION:  Abnormal mass in the left upper lobe worrisome for malignancy. Probable emphysema. [facility]
ion left lower lobe pneumonia. 

 

This document is electronically signed by Nathan Ruiz MD., May 27 2019 08:29:57 PM ET

## 2019-05-27 NOTE — XRAY REPORT
ROUTINE CHEST, TWO VIEWS:



HISTORY:  Evaluate for pneumonia.



No comparison at this facility. There is poor inspiratory effort. There 

is suggestion of a 3.7 cm masslike lesion in the lingula or anterior 

left lower lobe. A mass cannot be excluded. The remainder of the lungs 

are generally clear. No obvious pneumonia although mild hypoventilatory 

changes are suspected in the lower lung zones. No pleural effusion or 

pneumothorax. Heart size is within normal limits.



IMPRESSION:

Left lung opacity concerning for a mass. Further evaluation with CT 

chest with contrast is recommended.

## 2019-05-27 NOTE — CONSULTATION
History of Present Illness


Consult date: 05/27/19


Requesting physician: ZANE CALL


Reason for consult: lung mass


History of present illness: 





74 y/o male with history of TBI, admitted with change in mental state and 

weakness after being found by niece.  In completing work up CXR done and a left 

lower lobe mass seen on CXR.  Pulmonary consulted.  Patient going for echo now 

and hopeful for CT of chest later.  Nieces are in the room and they confirm that

patient is a smoker for over 50 years.  He has had weight loss.  They do not 

think he has coughed up any blood.  





Past History


Past Medical History: other (TBI, ICH 2008)


Social history: smoking, alcohol abuse





Medications and Allergies


                                    Allergies











Allergy/AdvReac Type Severity Reaction Status Date / Time


 


No Known Allergies Allergy   Verified 10/19/15 19:57











Active Meds: 


Active Medications





Acetaminophen (Tylenol)  650 mg PO Q4H PRN


   PRN Reason: Pain MILD(1-3)/Fever >100.5/HA


Aspirin (Aspirin)  325 mg PO QDAY [facility]


   Last Admin: 05/27/19 10:17 Dose:  325 mg


   Documented by: 


Atorvastatin Calcium (Lipitor)  40 mg PO QHS [facility]


Docusate Sodium (Colace)  100 mg PO BID [facility]


   Last Admin: 05/27/19 10:17 Dose:  100 mg


   Documented by: 


Enoxaparin Sodium (Lovenox)  40 mg SUB-Q QDAY@2200 [facility]


   Last Admin: 05/27/19 10:17 Dose:  40 mg


   Documented by: 


Hydralazine HCl (Apresoline)  10 mg IV Q4HR PRN


   PRN Reason: Blood Pressure


Ceftriaxone Sodium (Rocephin/Ns 1 Gm/50 Ml)  1 gm in 50 mls @ 100 mls/hr IV 

Q24HR [facility]; Protocol


   Last Infusion: 05/27/19 10:37 Dose:  100 mls/hr


   Documented by: 


Ondansetron HCl (Zofran)  4 mg IV Q8H PRN


   PRN Reason: Nausea And Vomiting


Sodium Chloride (Sodium Chloride Flush Syringe 10 Ml)  10 ml IV BID [facility]


   Last Admin: 05/27/19 10:31 Dose:  10 ml


   Documented by: 


Sodium Chloride (Sodium Chloride Flush Syringe 10 Ml)  10 ml IV PRN PRN


   PRN Reason: LINE FLUSH











Review of Systems


All systems: negative





Physical Examination


Vital signs: 


                                   Vital Signs











Temp Pulse Resp BP Pulse Ox


 


 98.5 F   85   16   141/73   99 


 


 05/27/19 00:16  05/27/19 00:16  05/27/19 00:16  05/27/19 00:16  05/27/19 00:16











General appearance: no acute distress, alert


ENT: oropharynx moist


Neck: supple


Effort: normal


Ascultation: Bilateral: diminished breath sounds


Percussion: Bilateral: not dull


Tactile fremitus: Bilateral: normal





Results





- Laboratory Findings


CBC and BMP: 


                                 05/27/19 00:58





                                 05/27/19 00:58


PT/INR, D-dimer











PT  14.5 Sec. (12.2-14.9)   05/27/19  00:58    


 


INR  1.06  (0.87-1.13)   05/27/19  00:58    








Abnormal lab findings: 


                                  Abnormal Labs











  05/27/19





  00:58


 


WBC  17.7 H


 


MCV  81 L


 


MCH  27 L


 


Seg Neutrophils # Man  11.7 H


 


Monocytes # (Manual)  1.1 H


 


Eosinophils # (Manual)  0.5 H














- Diagnostic Findings


Chest x-ray: image reviewed





Assessment and Plan





74 y/o male with lung mass





1.  Concern of course for malignancy.  Agree with CT.  Most likely patient will 

need biopsy and CT will help determine how this can be done.  Discussed with 

family at the bedside.





2.  All others per primary

## 2019-05-28 LAB
ALBUMIN SERPL-MCNC: 3.2 G/DL (ref 3.9–5)
ALT SERPL-CCNC: 9 UNITS/L (ref 7–56)
BASOPHILS # (AUTO): 0.1 K/MM3 (ref 0–0.1)
BASOPHILS NFR BLD AUTO: 0.7 % (ref 0–1.8)
BUN SERPL-MCNC: 11 MG/DL (ref 9–20)
BUN/CREAT SERPL: 18 %
CALCIUM SERPL-MCNC: 8.7 MG/DL (ref 8.4–10.2)
EOSINOPHIL # BLD AUTO: 0.4 K/MM3 (ref 0–0.4)
EOSINOPHIL NFR BLD AUTO: 2.6 % (ref 0–4.3)
HCT VFR BLD CALC: 38.7 % (ref 35.5–45.6)
HDLC SERPL-MCNC: 39 MG/DL (ref 40–59)
HEMOLYSIS INDEX: 2
HGB BLD-MCNC: 12.9 GM/DL (ref 11.8–15.2)
LYMPHOCYTES # BLD AUTO: 3 K/MM3 (ref 1.2–5.4)
LYMPHOCYTES NFR BLD AUTO: 17.7 % (ref 13.4–35)
MCHC RBC AUTO-ENTMCNC: 33 % (ref 32–34)
MCV RBC AUTO: 80 FL (ref 84–94)
MONOCYTES # (AUTO): 1.3 K/MM3 (ref 0–0.8)
MONOCYTES % (AUTO): 7.9 % (ref 0–7.3)
PLATELET # BLD: 443 K/MM3 (ref 140–440)
RBC # BLD AUTO: 4.87 M/MM3 (ref 3.65–5.03)

## 2019-05-28 RX ADMIN — ASPIRIN SCH MG: 325 TABLET ORAL at 10:10

## 2019-05-28 RX ADMIN — DOCUSATE SODIUM SCH MG: 100 CAPSULE, LIQUID FILLED ORAL at 10:09

## 2019-05-28 RX ADMIN — Medication SCH ML: at 21:58

## 2019-05-28 RX ADMIN — ENOXAPARIN SODIUM SCH: 100 INJECTION SUBCUTANEOUS at 21:17

## 2019-05-28 RX ADMIN — LEVETIRACETAM SCH MG: 500 TABLET, FILM COATED ORAL at 21:58

## 2019-05-28 RX ADMIN — LEVETIRACETAM SCH MG: 500 TABLET, FILM COATED ORAL at 10:09

## 2019-05-28 RX ADMIN — CEFTRIAXONE SODIUM SCH MLS/HR: 1 INJECTION, POWDER, FOR SOLUTION INTRAMUSCULAR; INTRAVENOUS at 10:10

## 2019-05-28 RX ADMIN — DOCUSATE SODIUM SCH MG: 100 CAPSULE, LIQUID FILLED ORAL at 21:58

## 2019-05-28 RX ADMIN — Medication SCH MG: at 10:09

## 2019-05-28 NOTE — MAGNETIC RESONANCE REPORT
MRA HEAD WITHOUT CONTRAST: 05/27/19 04:17:00



CLINICAL: Stroke.



TECHNIQUE: Axial 3-D time-of-flight MR angiography of the [place] 

[place] with review of axial source images.



FINDINGS: Intact [place] with no aneurysm, stenosis or 

occlusion.  Symmetric blood flow in the anterior, middle and posterior 

cerebral arteries.  Normal basilar and vertebral arteries.  The left 

vertebral artery is dominant.



IMPRESSION: Normal study.

## 2019-05-28 NOTE — PROGRESS NOTE
Assessment and Plan


Assessment and plan: 





Suspected Acute CVA with stroke in evolution vs Brain mets: treat with asa, sta

tin, MRI brain pending 





Lung mass suspected Lung cancer with Mets: get CT chest with contrast and 

consulted Pulmonology





Hypertension: low salt diet





Leukocytosis: empiric iv rocephin





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





Hx of EtOH abuse





Hx of Tobacco dependency:  on stopping





DVT prophylaxis on Lovenox





History


Interval history: 


Patient is a 72 yo man with a history of ICH in  secondary to TBI, tobacco 

dependency and alcohol abuse who presents to [facility] ED with c/o altered mental 

status and new right-sided weakness. Old deficits was loss of hearing and abno

rmal speech but no motor deficits per niece Valentina.  CT head without contrast 

Impression: Large area of hypoattenuation identified in the upper left frontal 

and temporal lobes, this surrounds a few areas of rounded increased attenuation 

in the high convexity of the posterior left frontal and posterior left temporal 

lobes. The findings may indicate sequelae from subacute to chronic infarction. 

The differential also included primary metastatic tumor in this region of the 

left cerebral hemisphere. Further evaluation with advanced cross-sectional 

imaging utilizing MRI would be appropriate. There is mild atrophy and slight 

periventricular deep white matter change. There is no evidence of acute 

hemorrhage or hematoma.  


No new issues overnight





Hospitalist Physical





- Constitutional


Vitals: 


                                        











Temp Pulse Resp BP Pulse Ox


 


 97.7 F   86   18   114/66   96 


 


 19 04:39  19 11:40  19 04:38  19 11:40  19 11:40











General appearance: Present: no acute distress, well-nourished





- EENT


Eyes: Present: PERRL, EOM intact


ENT: hearing intact, clear oral mucosa, dentition normal





- Neck


Neck: Present: supple, normal ROM





- Respiratory


Respiratory effort: normal


Respiratory: bilateral: CTA





- Cardiovascular


Rhythm: regular


Heart Sounds: Present: S1 & S2.  Absent: gallop, rub





- Extremities


Extremities: no ischemia, No edema, Full ROM





- Abdominal


General gastrointestinal: soft, non-tender, non-distended, normal bowel sounds





- Integumentary


Integumentary: Present: clear, warm, dry





- Neurologic


Neurologic: CNII-XII intact, moves all extremities





Results





- Labs


CBC & Chem 7: 


                                 05/28/19 05:41





                                 19 05:41


Labs: 


                             Laboratory Last Values











WBC  16.8 K/mm3 (4.5-11.0)  H  19  05:41    


 


RBC  4.87 M/mm3 (3.65-5.03)   19  05:41    


 


Hgb  12.9 gm/dl (11.8-15.2)   19  05:41    


 


Hct  38.7 % (35.5-45.6)   19  05:41    


 


MCV  80 fl (84-94)  L  19  05:41    


 


MCH  27 pg (28-32)  L  19  05:41    


 


MCHC  33 % (32-34)   19  05:41    


 


RDW  13.8 % (13.2-15.2)   19  05:41    


 


Plt Count  443 K/mm3 (140-440)  H  19  05:41    


 


Lymph % (Auto)  17.7 % (13.4-35.0)   19  05:41    


 


Mono % (Auto)  7.9 % (0.0-7.3)  H  19  05:41    


 


Eos % (Auto)  2.6 % (0.0-4.3)   19  05:41    


 


Baso % (Auto)  0.7 % (0.0-1.8)   19  05:41    


 


Lymph #  3.0 K/mm3 (1.2-5.4)   19  05:41    


 


Mono #  1.3 K/mm3 (0.0-0.8)  H  19  05:41    


 


Eos #  0.4 K/mm3 (0.0-0.4)   19  05:41    


 


Baso #  0.1 K/mm3 (0.0-0.1)   19  05:41    


 


Add Manual Diff  Complete   19  00:58    


 


Total Counted  100   19  00:58    


 


Seg Neutrophils %  71.1 % (40.0-70.0)  H  19  05:41    


 


Seg Neuts % (Manual)  66.0 % (40.0-70.0)   19  00:58    


 


  0 %  19  00:58    


 


  25.0 % (13.4-35.0)   19  00:58    


 


Reactive Lymphs % (Man)  0 %  19  00:58    


 


  6.0 % (0.0-7.3)   19  00:58    


 


  3.0 % (0.0-4.3)   19  00:58    


 


  0 % (0.0-1.8)   19  00:58    


 


  0 %  19  00:58    


 


  0 %  19  00:58    


 


  0 %  19  00:58    


 


  0 %  19  00:58    


 


Nucleated RBC %  Not Reportable   19  00:58    


 


Seg Neutrophils #  12.0 K/mm3 (1.8-7.7)  H  19  05:41    


 


Seg Neutrophils # Man  11.7 K/mm3 (1.8-7.7)  H  19  00:58    


 


Band Neutrophils #  0.0 K/mm3  19  00:58    


 


  4.4 K/mm3 (1.2-5.4)   19  00:58    


 


Abs React Lymphs (Man)  0.0 K/mm3  19  00:58    


 


  1.1 K/mm3 (0.0-0.8)  H  19  00:58    


 


  0.5 K/mm3 (0.0-0.4)  H  19  00:58    


 


  0.0 K/mm3 (0.0-0.1)   19  00:58    


 


  0.0 K/mm3  19  00:58    


 


  0.0 K/mm3  19  00:58    


 


  0.0 K/mm3  19  00:58    


 


Blast Cells #  0.0 K/mm3  19  00:58    


 


WBC Morphology  Not Reportable   19  00:58    


 


WBC Morphology  TNR   19  00:58    


 


Hypersegmented Neuts  Not Reportable   19  00:58    


 


Hyposegmented Neuts  Not Reportable   19  00:58    


 


Hypogranular Neuts  Not Reportable   19  00:58    


 


  Not Reportable   19  00:58    


 


  Not Reportable   19  00:58    


 


  Not Reportable   19  00:58    


 


  Not Reportable   19  00:58    


 


  Not Reportable   19  00:58    


 


  Not Reportable   19  00:58    


 


  Consistent w auto   19  00:58    


 


  Not Reportable   19  00:58    


 


Plt Clumps, EDTA  Not Reportable   19  00:58    


 


  Not Reportable   19  00:58    


 


  Not Reportable   19  00:58    


 


  Not Reportable   19  00:58    


 


Plt Morphology Comment  Not Reportable   19  00:58    


 


RBC Morphology  Not Reportable   19  00:58    


 


Dimorphic RBCs  Not Reportable   19  00:58    


 


  Not Reportable   19  00:58    


 


  Not Reportable   19  00:58    


 


  Not Reportable   19  00:58    


 


  1+   19  00:58    


 


  Not Reportable   19  00:58    


 


  Not Reportable   19  00:58    


 


  Not Reportable   19  00:58    


 


  Not Reportable   19  00:58    


 


  Not Reportable   19  00:58    


 


  Not Reportable   19  00:58    


 


  Not Reportable   19  00:58    


 


  Not Reportable   19  00:58    


 


  Not Reportable   19  00:58    


 


  Not Reportable   19  00:58    


 


  Not Reportable   19  00:58    


 


  Not Reportable   19  00:58    


 


  Not Reportable   19  00:58    


 


  Not Reportable   19  00:58    


 


  Not Reportable   19  00:58    


 


Acanthocytes (Spur)  Not Reportable   19  00:58    


 


Rouleaux  Not Reportable   19  00:58    


 


  Not Reportable   19  00:58    


 


  Not Reportable   19  00:58    


 


  Not Reportable   19  00:58    


 


  Not Reportable   19  00:58    


 


Hem Pathologist Commnt  No   19  00:58    


 


PT  14.5 Sec. (12.2-14.9)   19  00:58    


 


INR  1.06  (0.87-1.13)   19  00:58    


 


APTT  36.0 Sec. (24.2-36.6)   19  00:58    


 


  16.6 Sec. (15.1-19.6)   19  00:58    


 


Sodium  135 mmol/L (137-145)  L  19  05:41    


 


Potassium  3.9 mmol/L (3.6-5.0)   19  05:41    


 


Chloride  99.3 mmol/L ()   19  05:41    


 


Carbon Dioxide  25 mmol/L (22-30)   19  05:41    


 


  15 mmol/L  19  05:41    


 


BUN  11 mg/dL (9-20)   19  05:41    


 


  0.6 mg/dL (0.8-1.5)  L  19  05:41    


 


Estimated GFR  > 60 ml/min  19  05:41    


 


  18 %  19  05:41    


 


Glucose  96 mg/dL ()   19  05:41    


 


POC Glucose  99  ()   19  21:26    


 


  6.2 % (4-6)  H  19  05:41    


 


Lactic Acid  1.90 mmol/L (0.7-2.0)   19  04:35    


 


Calcium  8.7 mg/dL (8.4-10.2)   19  05:41    


 


  0.30 mg/dL (0.1-1.2)   19  05:41    


 


AST  14 units/L (5-40)   19  05:41    


 


ALT  9 units/L (7-56)   19  05:41    


 


  64 units/L ()   19  05:41    


 


  < 0.010 ng/mL (0.00-0.029)   19  00:58    


 


  7.1 g/dL (6.3-8.2)   19  05:41    


 


  3.2 g/dL (3.9-5)  L  19  05:41    


 


  0.8 %  19  05:41    


 


Triglycerides  65 mg/dL (2-149)   19  05:41    


 


Cholesterol  137 mg/dL ()   19  05:41    


 


  87 mg/dL ()   19  05:41    


 


  39 mg/dL (40-59)  L  19  05:41    


 


  3.51 %  19  05:41    














Active Medications





- Current Medications


Current Medications: 














Generic Name Dose Route Start Last Admin





  Trade Name Freq  PRN Reason Stop Dose Admin


 


Acetaminophen  650 mg  19 04:17 





  Tylenol  PO  





  Q4H PRN  





  Pain MILD(1-3)/Fever >100.5/HA  


 


Aspirin  325 mg  19 10:00  19 10:10





  Aspirin  PO   325 mg





  QDAY JOSE LUIS   Administration


 


Atorvastatin Calcium  40 mg  19 22:00  19 22:55





  Lipitor  PO   40 mg





  QHS JOSE LUIS   Administration


 


Docusate Sodium  100 mg  19 10:00  19 10:09





  Colace  PO   100 mg





  BID JOSE LUIS   Administration


 


Enoxaparin Sodium  40 mg  19 06:00  19 22:55





  Lovenox  SUB-Q   40 mg





  QDAY@2200 JOSE LUIS   Administration


 


Hydralazine HCl  10 mg  19 04:57 





  Apresoline  IV  





  Q4HR PRN  





  Blood Pressure  


 


Ceftriaxone Sodium  1 gm in 50 mls @ 100 mls/hr  19 06:16  19 10:10





  Rocephin/Ns 1 Gm/50 Ml  IV   100 mls/hr





  Q24HR JOSE LUIS   Administration





  Protocol  


 


Levetiracetam  1,000 mg  19 22:00  19 10:09





  Keppra  PO   1,000 mg





  BID JOSE LUIS   Administration


 


Ondansetron HCl  4 mg  19 04:17 





  Zofran  IV  





  Q8H PRN  





  Nausea And Vomiting  


 


Pyridoxine HCl  200 mg  19 17:00  19 10:09





  Vitamin B-6  PO   200 mg





  QDAY JOSE LUIS   Administration


 


Sodium Chloride  10 ml  19 10:00  19 22:55





  Sodium Chloride Flush Syringe 10 Ml  IV   10 ml





  BID JOSE LUIS   Administration


 


Sodium Chloride  10 ml  19 04:17 





  Sodium Chloride Flush Syringe 10 Ml  IV  





  PRN PRN  





  LINE FLUSH  














Nutrition/Malnutrition Assess





- Dietary Evaluation


Nutrition/Malnutrition Findings: 


                                        





Nutrition Notes                                            Start:  19 

15:53


Freq:                                                      Status: Active       




Protocol:                                                                       




 Document     19 15:53  NEERAJ  (Rec: 19 15:56  NEERAJ  SRW-

[email or web address])


 Nutrition Notes


     Need for Assessment generated from:         MD Order


     Initial or Follow up                        Brief Note


     Other Pertinent Diagnosis                   AMS, ?CVA, Frequent falls


     Current Diet                                Cardiac


     Labs/Tests                                  All are WNL


     Height                                      5 ft 8 in


     Weight                                      72.575 kg


     Ideal Body Weight (kg)                      70.00


     BMI                                         24.3


     Weight Status                               Appropriate


     Subjective/Other Information                RD consulted for diet


                                                 education.  /73 upon


                                                 admission and has not been >


                                                 138/68 since admission.


     Is patient on ventilator?                   No


     Is Patient Ambulatory and/or Out of Bed     No


     REE-([facility]-confined to bed)      [phone]


     Calculation Used for Recommendations        [facility]


     Additional Notes                            Pro needs 1-1.2g/k-87g/


                                                 day


                                                 Fluid needs 1ml/kcal


 Nutrition Intervention


     Follow-Up By:                               19


     Additional Comments                         F/U: intakes

## 2019-05-28 NOTE — PROGRESS NOTE
Assessment and Plan





Acute stroke


Partial complex seizures


Suspected metastatic brain disease


Left upper lobe mass


COPD





Recommendations





Seizure, Stroke management per neurology.


Albuterol 2.5 mg every 6 hours as needed for shortness of breath or chest conges

tion


Review chest CT scan for possible bronchoscopy.  I did notice some comments 

about increased edema/cerebral pressure on neurology note and will appreciate 

additional recommendations regarding best timing for bronchoscopy to be done in 

this context.  If okay with procedure, will discuss with family and schedule on 

next available time








Thanks








Subjective


Date of service: 05/28/19


Principal diagnosis: right hemiparesis, stroke, left upper lobe mass


Interval history: 





No respiratory complaints at this time.  Getting physical therapy evaluation





Objective


                               Vital Signs - 12hr











  05/28/19 05/28/19 05/28/19





  04:38 04:39 05:00


 


Temperature  97.7 F 


 


Pulse Rate 77  77


 


Respiratory 18  





Rate   


 


Blood Pressure 120/68  


 


O2 Sat by Pulse 96  





Oximetry   











Constitutional: no acute distress, alert


ENT: oropharynx moist


Neck: supple


Effort: normal


Ascultation: Bilateral: clear, diminished breath sounds


Percussion: Bilateral: not dull


Tactile fremitus: Bilateral: normal


Cardiovascular: regular rate and rhythm


Integumentary: normal


Extremities: no cyanosis, no cyanosis, no cyanosis


Neurologic: other (right hemiparesis)


CBC and BMP: 


                                 05/28/19 05:41





                                 05/28/19 05:41


ABG, PT/INR, D-dimer: 


PT/INR, D-dimer











PT  14.5 Sec. (12.2-14.9)   05/27/19  00:58    


 


INR  1.06  (0.87-1.13)   05/27/19  00:58    








Abnormal lab findings: 


                                  Abnormal Labs











  05/27/19 05/28/19 05/28/19





  00:58 05:41 05:41


 


WBC  17.7 H  16.8 H 


 


MCV  81 L  80 L 


 


MCH  27 L  27 L 


 


Plt Count   443 H 


 


Mono % (Auto)   7.9 H 


 


[place] #   1.3 H 


 


Seg Neutrophils %   71.1 H 


 


Seg Neutrophils #   12.0 H 


 


Seg Neutrophils # Man  11.7 H  


 


Monocytes # (Manual)  1.1 H  


 


Eosinophils # (Manual)  0.5 H  


 


Sodium    135 L


 


Creatinine    0.6 L


 


Hemoglobin A1c   


 


Albumin    3.2 L


 


HDL Cholesterol    39 L














  05/28/19





  05:41


 


WBC 


 


MCV 


 


MCH 


 


Plt Count 


 


Mono % (Auto) 


 


Mono # 


 


Seg Neutrophils % 


 


Seg Neutrophils # 


 


Seg Neutrophils # Man 


 


Monocytes # (Manual) 


 


Eosinophils # (Manual) 


 


Sodium 


 


Creatinine 


 


Hemoglobin A1c  6.2 H


 


Albumin 


 


HDL Cholesterol

## 2019-05-28 NOTE — MAGNETIC RESONANCE REPORT
MRI BRAIN WITHOUT AND WITH CONTRAST: 05/28/19 08:12:00



CLINICAL: Brain masses by CT.  Right-sided weakness.



TECHNIQUE: Axial diffusion, T1, FLAIR, gradient echo T2*, and coronal 

and axial T2 and sagittal T1 plus coronal and axial postcontrast T1 

sequences on a 1.5 Cecy magnet.  12.0 cc of Multihance was injected 

intravenously for the contrast portion of the exam. Consent was 

obtained prior to the administration of contrast.



FINDINGS: Multiple (at least 5) left cerebral masses demonstrate some 

element of restricted diffusion but no other restricted diffusion.  The 

largest mass is in the left frontal lobe and measures 2.5 x 2.0 cm.  

The next largest is a left parietal and measures 2.3 x 2.3 cm.  2 left 

occipital lobe masses measure approximately 1 cm in each.  All the 

masses demonstrate enhancement postcontrast.  There is significant 

associated vasogenic edema but there is no midline shift.  The 

ventricles and sulci are overall enlarged for age.  Several of the 

masses demonstrate hypointensity on the gradient echo sequence 

consistent with an element of hemorrhage which are likely subacute or 

chronic.  No chronic microbleeds on the gradient echo sequence.  No 

extra-axial collections.  Normal pituitary and optic chiasm.  The 

brainstem and cerebellum are normal.  Intact vascular flow voids.  The 

orbits, sinuses and soft tissues are normal.  Normal calvarium and 

skull base.



IMPRESSION: 1.  Multiple (at least 5) left cerebral masses which are 

consistent with metastatic lesions involving the frontal, parietal and 

occipital lobes.  2.  There is a small amount of subacute or chronic 

hemorrhage associated with most all of the lesions.  3.  Mass effect in 

the left cerebral hemisphere but no midline shift.  4.  No evidence of 

acute/subacute infarct.



I spoke to Dr. Jorge Szymanski regarding these findings on 05/28/19 at 

1330.

## 2019-05-29 RX ADMIN — DEXAMETHASONE SODIUM PHOSPHATE SCH MG: 4 INJECTION, SOLUTION INTRAMUSCULAR; INTRAVENOUS at 13:32

## 2019-05-29 RX ADMIN — LEVETIRACETAM SCH MG: 500 TABLET, FILM COATED ORAL at 22:14

## 2019-05-29 RX ADMIN — DOCUSATE SODIUM SCH MG: 100 CAPSULE, LIQUID FILLED ORAL at 22:14

## 2019-05-29 RX ADMIN — DEXAMETHASONE SODIUM PHOSPHATE SCH MG: 4 INJECTION, SOLUTION INTRAMUSCULAR; INTRAVENOUS at 18:43

## 2019-05-29 RX ADMIN — Medication SCH MG: at 09:46

## 2019-05-29 RX ADMIN — CEFTRIAXONE SODIUM SCH MLS/HR: 1 INJECTION, POWDER, FOR SOLUTION INTRAMUSCULAR; INTRAVENOUS at 09:44

## 2019-05-29 RX ADMIN — DOCUSATE SODIUM SCH MG: 100 CAPSULE, LIQUID FILLED ORAL at 09:45

## 2019-05-29 RX ADMIN — ASPIRIN SCH: 325 TABLET ORAL at 09:45

## 2019-05-29 RX ADMIN — Medication SCH ML: at 22:15

## 2019-05-29 RX ADMIN — Medication SCH ML: at 09:44

## 2019-05-29 RX ADMIN — LEVETIRACETAM SCH MG: 500 TABLET, FILM COATED ORAL at 09:45

## 2019-05-29 NOTE — PROGRESS NOTE
Assessment and Plan





Acute stroke. No new events reported. Alone in room


Partial complex seizures


Suspected metastatic brain disease


Left upper lobe mass


COPD





Recommendations





Seizure, Stroke management per neurology.


Albuterol 2.5 mg every 6 hours as needed for shortness of breath or chest 

congestion


No family for bronchoscopy discussion.  Regarding increased edema/cerebral 

pressure/Decadron use on neurology note, will appreciate additional 

recommendations regarding best timing for bronchoscopy. Will schedule 

preliminarily for Friday morning, if agreeable with family and Neurology 

comments. If not possible, alternative is to do OPD








Thanks








Subjective


Date of service: 05/29/19


Principal diagnosis: right hemiparesis, stroke, left upper lobe mass


Interval history: 





No respiratory complains





Objective


                               Vital Signs - 12hr











  05/28/19 05/29/19 05/29/19





  23:34 03:53 05:00


 


Temperature 98.7 F 98.8 F 


 


Pulse Rate 75 80 80


 


Respiratory 17 16 





Rate   


 


Blood Pressure 109/61 101/57 


 


Blood Pressure   





[Left]   


 


O2 Sat by Pulse 100 92 





Oximetry   














  05/29/19 05/29/19





  07:41 07:42


 


Temperature 98.5 F 98.5 F


 


Pulse Rate  65


 


Respiratory 16 16





Rate  


 


Blood Pressure 113/65 


 


Blood Pressure  113/65





[Left]  


 


O2 Sat by Pulse  97





Oximetry  











Constitutional: no acute distress, alert


ENT: oropharynx moist


Neck: supple


Effort: normal


Ascultation: Bilateral: clear, diminished breath sounds


Percussion: Bilateral: not dull


Tactile fremitus: Bilateral: normal


Cardiovascular: regular rate and rhythm


Integumentary: normal


Extremities: no cyanosis, no cyanosis, no cyanosis


Neurologic: other (right hemiparesis)


CBC and BMP: 


                                 05/28/19 05:41





                                 05/28/19 05:41


ABG, PT/INR, D-dimer: 


PT/INR, D-dimer











PT  14.5 Sec. (12.2-14.9)   05/27/19  00:58    


 


INR  1.06  (0.87-1.13)   05/27/19  00:58    








Abnormal lab findings: 


                                  Abnormal Labs











  05/27/19 05/28/19 05/28/19





  00:58 05:41 05:41


 


WBC  17.7 H  16.8 H 


 


MCV  81 L  80 L 


 


MCH  27 L  27 L 


 


Plt Count   443 H 


 


Mono % (Auto)   7.9 H 


 


[place] #   1.3 H 


 


Seg Neutrophils %   71.1 H 


 


Seg Neutrophils #   12.0 H 


 


Seg Neutrophils # Man  11.7 H  


 


Monocytes # (Manual)  1.1 H  


 


Eosinophils # (Manual)  0.5 H  


 


Sodium    135 L


 


Creatinine    0.6 L


 


Hemoglobin A1c   


 


Albumin    3.2 L


 


HDL Cholesterol    39 L














  05/28/19





  05:41


 


WBC 


 


MCV 


 


MCH 


 


Plt Count 


 


Mono % (Auto) 


 


Mono # 


 


Seg Neutrophils % 


 


Seg Neutrophils # 


 


Seg Neutrophils # Man 


 


Monocytes # (Manual) 


 


Eosinophils # (Manual) 


 


Sodium 


 


Creatinine 


 


Hemoglobin A1c  6.2 H


 


Albumin 


 


HDL Cholesterol

## 2019-05-29 NOTE — PROGRESS NOTE
Assessment and Plan


Assessment and plan: 





Multiple left cerebral masses consistent with metastatic lesions:  Patient with 

lesions involving frontal, parietal and occipital lobes.  No evidence of 

infarction.  Decadron and oncology consult.





Lung mass suspected Lung cancer with Mets: Bronchoscopy scheduled for Friday per

pulmonary.





Hypertension: low salt diet





Leukocytosis: empiric iv rocephin





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





Hx of EtOH abuse





Hx of Tobacco dependency:  on stopping





DVT prophylaxis on Lovenox





History


Interval history: 


Patient is a 72 yo man with a history of ICH in  secondary to TBI, tobacco 

dependency and alcohol abuse who presents to [facility] ED with c/o altered mental 

status and new right-sided weakness. Old deficits was loss of hearing and 

abnormal speech but no motor deficits per niece Valentina.  CT head without 

contrast revealed Large area of hypoattenuation identified in the upper left 

frontal and temporal lobes, this surrounds a few areas of rounded increased 

attenuation in the high convexity of the posterior left frontal and posterior 

left temporal lobes. The findings may indicate sequelae from subacute to chronic

infarction. The differential also included primary metastatic tumor in this 

region of the left cerebral hemisphere. Further evaluation with advanced cross-

sectional imaging utilizing MRI revealed multiple left cerebral masses which are

consistent with metastatic lesions involving the frontoparietal and occipital 

lobes.  There is a small amount of subcutaneous hemorrhage associated with most 

all of the lesions.  Mass effect in the left cerebral hemisphere but no midline 

shift.  No evidence of acute/subacute infarct.  The patient was started on 

Decadron and oncology consult.  Pulmonary also saw the patient in consultation 

with regards to likely primary lesion with pulmonary nodule.


No new issues overnight





Hospitalist Physical





- Constitutional


Vitals: 


                                        











Temp Pulse Resp BP Pulse Ox


 


 98.1 F   84   18   115/65   95 


 


 19 12:17  19 12:17  19 12:17  19 12:17  19 12:17











General appearance: Present: no acute distress, well-nourished





Results





- Labs


CBC & Chem 7: 


                                 19 05:41





                                 19 05:41


Labs: 


                             Laboratory Last Values











WBC  16.8 K/mm3 (4.5-11.0)  H  19  05:41    


 


RBC  4.87 M/mm3 (3.65-5.03)   19  05:41    


 


Hgb  12.9 gm/dl (11.8-15.2)   19  05:41    


 


Hct  38.7 % (35.5-45.6)   19  05:41    


 


MCV  80 fl (84-94)  L  19  05:41    


 


MCH  27 pg (28-32)  L  19  05:41    


 


MCHC  33 % (32-34)   19  05:41    


 


RDW  13.8 % (13.2-15.2)   19  05:41    


 


Plt Count  443 K/mm3 (140-440)  H  19  05:41    


 


Lymph % (Auto)  17.7 % (13.4-35.0)   19  05:41    


 


Mono % (Auto)  7.9 % (0.0-7.3)  H  19  05:41    


 


Eos % (Auto)  2.6 % (0.0-4.3)   19  05:41    


 


Baso % (Auto)  0.7 % (0.0-1.8)   19  05:41    


 


Lymph #  3.0 K/mm3 (1.2-5.4)   19  05:41    


 


Mono #  1.3 K/mm3 (0.0-0.8)  H  19  05:41    


 


Eos #  0.4 K/mm3 (0.0-0.4)   19  05:41    


 


Baso #  0.1 K/mm3 (0.0-0.1)   19  05:41    


 


Add Manual Diff  Complete   19  00:58    


 


Total Counted  100   19  00:58    


 


Seg Neutrophils %  71.1 % (40.0-70.0)  H  19  05:41    


 


Seg Neuts % (Manual)  66.0 % (40.0-70.0)   19  00:58    


 


  0 %  19  00:58    


 


  25.0 % (13.4-35.0)   19  00:58    


 


Reactive Lymphs % (Man)  0 %  19  00:58    


 


  6.0 % (0.0-7.3)   19  00:58    


 


  3.0 % (0.0-4.3)   19  00:58    


 


  0 % (0.0-1.8)   19  00:58    


 


  0 %  19  00:58    


 


  0 %  19  00:58    


 


  0 %  19  00:58    


 


  0 %  19  00:58    


 


Nucleated RBC %  Not Reportable   19  00:58    


 


Seg Neutrophils #  12.0 K/mm3 (1.8-7.7)  H  19  05:41    


 


Seg Neutrophils # Man  11.7 K/mm3 (1.8-7.7)  H  19  00:58    


 


Band Neutrophils #  0.0 K/mm3  19  00:58    


 


  4.4 K/mm3 (1.2-5.4)   19  00:58    


 


Abs React Lymphs (Man)  0.0 K/mm3  19  00:58    


 


  1.1 K/mm3 (0.0-0.8)  H  19  00:58    


 


  0.5 K/mm3 (0.0-0.4)  H  19  00:58    


 


  0.0 K/mm3 (0.0-0.1)   19  00:58    


 


  0.0 K/mm3  19  00:58    


 


  0.0 K/mm3  19  00:58    


 


  0.0 K/mm3  19  00:58    


 


Blast Cells #  0.0 K/mm3  19  00:58    


 


WBC Morphology  Not Reportable   19  00:58    


 


WBC Morphology  TNR   19  00:58    


 


Hypersegmented Neuts  Not Reportable   19  00:58    


 


Hyposegmented Neuts  Not Reportable   19  00:58    


 


Hypogranular Neuts  Not Reportable   19  00:58    


 


  Not Reportable   19  00:58    


 


  Not Reportable   19  00:58    


 


  Not Reportable   19  00:58    


 


  Not Reportable   19  00:58    


 


  Not Reportable   19  00:58    


 


  Not Reportable   19  00:58    


 


  Consistent w auto   19  00:58    


 


  Not Reportable   19  00:58    


 


Plt Clumps, EDTA  Not Reportable   19  00:58    


 


  Not Reportable   19  00:58    


 


  Not Reportable   19  00:58    


 


  Not Reportable   19  00:58    


 


Plt Morphology Comment  Not Reportable   19  00:58    


 


RBC Morphology  Not Reportable   19  00:58    


 


Dimorphic RBCs  Not Reportable   19  00:58    


 


  Not Reportable   19  00:58    


 


  Not Reportable   19  00:58    


 


  Not Reportable   19  00:58    


 


  1+   19  00:58    


 


  Not Reportable   19  00:58    


 


  Not Reportable   19  00:58    


 


  Not Reportable   19  00:58    


 


  Not Reportable   19  00:58    


 


  Not Reportable   19  00:58    


 


  Not Reportable   19  00:58    


 


  Not Reportable   19  00:58    


 


  Not Reportable   19  00:58    


 


  Not Reportable   19  00:58    


 


  Not Reportable   19  00:58    


 


  Not Reportable   19  00:58    


 


  Not Reportable   19  00:58    


 


  Not Reportable   19  00:58    


 


  Not Reportable   19  00:58    


 


  Not Reportable   19  00:58    


 


Acanthocytes (Spur)  Not Reportable   19  00:58    


 


Rouleaux  Not Reportable   19  00:58    


 


  Not Reportable   19  00:58    


 


  Not Reportable   19  00:58    


 


  Not Reportable   19  00:58    


 


  Not Reportable   19  00:58    


 


Hem Pathologist Commnt  No   19  00:58    


 


PT  14.5 Sec. (12.2-14.9)   19  00:58    


 


INR  1.06  (0.87-1.13)   19  00:58    


 


APTT  36.0 Sec. (24.2-36.6)   19  00:58    


 


  16.6 Sec. (15.1-19.6)   19  00:58    


 


Sodium  135 mmol/L (137-145)  L  19  05:41    


 


Potassium  3.9 mmol/L (3.6-5.0)   19  05:41    


 


Chloride  99.3 mmol/L ()   19  05:41    


 


Carbon Dioxide  25 mmol/L (22-30)   19  05:41    


 


  15 mmol/L  19  05:41    


 


BUN  11 mg/dL (9-20)   19  05:41    


 


  0.6 mg/dL (0.8-1.5)  L  19  05:41    


 


Estimated GFR  > 60 ml/min  19  05:41    


 


  18 %  19  05:41    


 


Glucose  96 mg/dL ()   19  05:41    


 


POC Glucose  99  ()   19  21:26    


 


  6.2 % (4-6)  H  19  05:41    


 


Lactic Acid  1.90 mmol/L (0.7-2.0)   19  04:35    


 


Calcium  8.7 mg/dL (8.4-10.2)   19  05:41    


 


  0.30 mg/dL (0.1-1.2)   19  05:41    


 


AST  14 units/L (5-40)   19  05:41    


 


ALT  9 units/L (7-56)   19  05:41    


 


  64 units/L ()   19  05:41    


 


  < 0.010 ng/mL (0.00-0.029)   19  00:58    


 


  7.1 g/dL (6.3-8.2)   19  05:41    


 


  3.2 g/dL (3.9-5)  L  19  05:41    


 


  0.8 %  19  05:41    


 


Triglycerides  65 mg/dL (2-149)   19  05:41    


 


Cholesterol  137 mg/dL ()   19  05:41    


 


  87 mg/dL ()   19  05:41    


 


  39 mg/dL (40-59)  L  19  05:41    


 


  3.51 %  19  05:41    














Active Medications





- Current Medications


Current Medications: 














Generic Name Dose Route Start Last Admin





  Trade Name Freq  PRN Reason Stop Dose Admin


 


Acetaminophen  650 mg  19 04:17 





  Tylenol  PO  





  Q4H PRN  





  Pain MILD(1-3)/Fever >100.5/HA  


 


Atorvastatin Calcium  40 mg  19 22:00  19 21:58





  Lipitor  PO   40 mg





  QHS JOSE LUIS   Administration


 


Dexamethasone  4 mg  19 12:00  19 13:32





  Decadron  IV   4 mg





  Q6HR JOSE LUIS   Administration


 


Docusate Sodium  100 mg  19 10:00  19 09:45





  Colace  PO   100 mg





  BID JOSE LUIS   Administration


 


Hydralazine HCl  10 mg  19 04:57 





  Apresoline  IV  





  Q4HR PRN  





  Blood Pressure  


 


Ceftriaxone Sodium  1 gm in 50 mls @ 100 mls/hr  19 06:16  19 09:44





  Rocephin/Ns 1 Gm/50 Ml  IV   100 mls/hr





  Q24HR JOSE LUIS   Administration





  Protocol  


 


Levetiracetam  1,000 mg  19 22:00  19 09:45





  Keppra  PO   1,000 mg





  BID JOSE LUIS   Administration


 


Ondansetron HCl  4 mg  19 04:17 





  Zofran  IV  





  Q8H PRN  





  Nausea And Vomiting  


 


Pyridoxine HCl  200 mg  19 17:00  19 09:46





  Vitamin B-6  PO   200 mg





  QDAY JOSE LUIS   Administration


 


Sodium Chloride  10 ml  19 10:00  19 09:44





  Sodium Chloride Flush Syringe 10 Ml  IV   10 ml





  BID JOSE LUIS   Administration


 


Sodium Chloride  10 ml  19 04:17 





  Sodium Chloride Flush Syringe 10 Ml  IV  





  PRN PRN  





  LINE FLUSH  














Nutrition/Malnutrition Assess





- Dietary Evaluation


Nutrition/Malnutrition Findings: 


                                        





Nutrition Notes                                            Start:  19 15

:53


Freq:                                                      Status: Active       




Protocol:                                                                       




 Document     19 15:53  NEERAJ  (Rec: 19 15:56  NEERAJ  SRW-

[id number])


 Nutrition Notes


     Need for Assessment generated from:         MD Order


     Initial or Follow up                        Brief Note


     Other Pertinent Diagnosis                   AMS, ?CVA, Frequent falls


     Current Diet                                Cardiac


     Labs/Tests                                  All are WNL


     Height                                      5 ft 8 in


     Weight                                      72.575 kg


     Ideal Body Weight (kg)                      70.00


     BMI                                         24.3


     Weight Status                               Appropriate


     Subjective/Other Information                RD consulted for diet


                                                 education.  /73 upon


                                                 admission and has not been >


                                                 138/68 since admission.


     Is patient on ventilator?                   No


     Is Patient Ambulatory and/or Out of Bed     No


     REE-([facility]-confined to bed)      3044.528


     Calculation Used for Recommendations        [facility]


     Additional Notes                            Pro needs 1-1.2g/k-87g/


                                                 day


                                                 Fluid needs 1ml/kcal


 Nutrition Intervention


     Follow-Up By:                               19


     Additional Comments                         F/U: intakes

## 2019-05-30 RX ADMIN — Medication SCH ML: at 22:42

## 2019-05-30 RX ADMIN — DOCUSATE SODIUM SCH MG: 100 CAPSULE, LIQUID FILLED ORAL at 09:08

## 2019-05-30 RX ADMIN — LEVETIRACETAM SCH MG: 500 TABLET, FILM COATED ORAL at 09:07

## 2019-05-30 RX ADMIN — LEVETIRACETAM SCH MG: 500 TABLET, FILM COATED ORAL at 22:42

## 2019-05-30 RX ADMIN — CEFTRIAXONE SODIUM SCH MLS/HR: 1 INJECTION, POWDER, FOR SOLUTION INTRAMUSCULAR; INTRAVENOUS at 09:18

## 2019-05-30 RX ADMIN — DEXAMETHASONE SODIUM PHOSPHATE SCH MG: 4 INJECTION, SOLUTION INTRAMUSCULAR; INTRAVENOUS at 06:50

## 2019-05-30 RX ADMIN — Medication SCH ML: at 09:08

## 2019-05-30 RX ADMIN — DEXAMETHASONE SODIUM PHOSPHATE SCH MG: 4 INJECTION, SOLUTION INTRAMUSCULAR; INTRAVENOUS at 03:34

## 2019-05-30 RX ADMIN — DEXAMETHASONE SODIUM PHOSPHATE SCH MG: 4 INJECTION, SOLUTION INTRAMUSCULAR; INTRAVENOUS at 18:39

## 2019-05-30 RX ADMIN — Medication SCH MG: at 09:11

## 2019-05-30 RX ADMIN — Medication SCH: at 20:30

## 2019-05-30 RX ADMIN — DOCUSATE SODIUM SCH MG: 100 CAPSULE, LIQUID FILLED ORAL at 22:42

## 2019-05-30 RX ADMIN — DEXAMETHASONE SODIUM PHOSPHATE SCH MG: 4 INJECTION, SOLUTION INTRAMUSCULAR; INTRAVENOUS at 11:28

## 2019-05-30 NOTE — CONSULTATION
History of Present Illness





- Reason for Consult


Consult date: 05/30/19


left pulmonary mass





- History of Present Illness


73 year old man with a history of ICH in 2008 secondary to TBI, tobacco dep

endency and alcohol abuse who presents to [facility] ED with complaints of altered 

mental status and new right-sided weakness. Old deficits was loss of hearing and

abnormal speech but no motor deficits per niece Valentina.  CT head without 

contrast revealed Large area of hypoattenuation identified in the upper left 

frontal and temporal lobes, this surrounds a few areas of rounded increased 

attenuation in the high convexity of the posterior left frontal and posterior 

left temporal lobes. The findings may indicate sequelae from subacute to chronic

infarction. The differential also included primary metastatic tumor in this 

region of the left cerebral hemisphere. Further evaluation with advanced cross-

sectional imaging utilizing MRI revealed multiple left cerebral masses which are

consistent with metastatic lesions involving the frontoparietal and occipital 

lobes.  There is a small amount of subcutaneous hemorrhage associated with most 

all of the lesions.  Mass effect in the left cerebral hemisphere but no midline 

shift.  No evidence of acute/subacute infarct.  The patient was started on 

Decadron and oncology consult.  Pulmonary also saw the patient in consultation 

with regards to likely primary lesion with pulmonary nodule.





The patient is not able to speak clearly.  He has right sided upper and lower 

extremity focal weakness.  He also has some diffuse weakness overall.





Past History


Past Medical History: other (TBI, ICH 2008)


Past Surgical History: Other (pins and halo placed circumferentially around left

leg due to fracture in the past)


Social history: single, lives with family (with sister and niece), smoking 

(about one pack per day), alcohol abuse (occasional beer or occasional half pint

of hard liquor though he used to drink half a pint per day), other (worked as a 

 but retired prior to the 2012 injury).  denies: prescription drug abuse,

IV drug use


Family history: diabetes (mother), hypertension (paternal side, mother and 

brother), stroke (paternal uncle), other (negative for epilepsy or brain tumor)





Medications and Allergies


                                    Allergies











Allergy/AdvReac Type Severity Reaction Status Date / Time


 


No Known Allergies Allergy   Verified 10/19/15 19:57











Active Meds: 


Active Medications





Acetaminophen (Tylenol)  650 mg PO Q4H PRN


   PRN Reason: Pain MILD(1-3)/Fever >100.5/HA


Atorvastatin Calcium (Lipitor)  40 mg PO QHS [facility]


   Last Admin: 05/29/19 22:14 Dose:  40 mg


   Documented by: 


Dexamethasone (Decadron)  4 mg IV Q6HR [facility]


   Last Admin: 05/30/19 06:50 Dose:  4 mg


   Documented by: 


Docusate Sodium (Colace)  100 mg PO BID [facility]


   Last Admin: 05/30/19 09:08 Dose:  100 mg


   Documented by: 


Hydralazine HCl (Apresoline)  10 mg IV Q4HR PRN


   PRN Reason: Blood Pressure


Ceftriaxone Sodium (Rocephin/Ns 1 Gm/50 Ml)  1 gm in 50 mls @ 100 mls/hr IV 

Q24HR [facility]; Protocol


   Last Admin: 05/30/19 09:18 Dose:  100 mls/hr


   Documented by: 


Levetiracetam (Keppra)  1,000 mg PO BID [facility]


   Last Admin: 05/30/19 09:07 Dose:  1,000 mg


   Documented by: 


Ondansetron HCl (Zofran)  4 mg IV Q8H PRN


   PRN Reason: Nausea And Vomiting


Pyridoxine HCl (Vitamin B-6)  200 mg PO QDAY [facility]


   Last Admin: 05/30/19 09:11 Dose:  200 mg


   Documented by: 


Sodium Chloride (Sodium Chloride Flush Syringe 10 Ml)  10 ml IV BID [facility]


   Last Admin: 05/30/19 09:08 Dose:  10 ml


   Documented by: 


Sodium Chloride (Sodium Chloride Flush Syringe 10 Ml)  10 ml IV PRN PRN


   PRN Reason: LINE FLUSH











Review of Systems


ROS unobtainable: due to mental status





Exam





- Constitutional


Vitals: 


                                        











Temp Pulse Resp BP Pulse Ox


 


 98.2 F   76   18   125/75   98 


 


 05/30/19 07:46  05/30/19 07:47  05/30/19 07:46  05/30/19 07:46  05/30/19 07:47











General appearance: Present: no acute distress





- EENT


ENT: no hearing intact





- Respiratory


Respiratory effort: normal





- Extremities


Extremities: normal temperature, normal color





- Abdominal


General gastrointestinal: Present: soft





- Psychiatric


Psychiatric: no intact judgment & insight





- Neurologic


Neurologic: other (diffuse weakness with superimposed right sided upper and 

lower extremity weakness, difficulty speaking)





Results





- Labs


CBC & Chem 7: 


                                 05/28/19 05:41





                                 05/28/19 05:41





Assessment and Plan





73-year-old male with presentation of right-sided weakness and underlying 

neurologic deficits from prior issues with mild diffuse weakness overall.





Patient has left-sided lung mass.  Plan for bronchoscopy tomorrow per 

pulmonology if they get neurology clearance.





If results are negative, then we'll discuss with niece if she wants to proceed 

with percutaneous lung biopsy.  Risk is pneumothorax which can occur in an 

emphysematous patient like this one and since he has poor overall health status 

this could result in chest tube placement with prolonged intubation and 

mortality.

## 2019-05-30 NOTE — CONSULTATION
Past History


Past Medical History: other (TBI, ICH 2008)


Past Surgical History: Other (pins and halo placed circumferentially around left

leg due to fracture in the past)


Social history: single, lives with family (with sister and niece), smoking 

(about one pack per day), alcohol abuse (occasional beer or occasional half pint

of hard liquor though he used to drink half a pint per day), other (worked as a 

 but retired prior to the 2012 injury).  denies: prescription drug abuse,

IV drug use


Family history: diabetes (mother), hypertension (paternal side, mother and 

brother), stroke (paternal uncle), other (negative for epilepsy or brain tumor)





Medications and Allergies


                                    Allergies











Allergy/AdvReac Type Severity Reaction Status Date / Time


 


No Known Allergies Allergy   Verified 10/19/15 19:57











Active Meds: 


Active Medications





Acetaminophen (Tylenol)  650 mg PO Q4H PRN


   PRN Reason: Pain MILD(1-3)/Fever >100.5/HA


Atorvastatin Calcium (Lipitor)  40 mg PO QHS [facility]


   Last Admin: 05/29/19 22:14 Dose:  40 mg


   Documented by: 


Dexamethasone (Decadron)  4 mg IV Q6HR [facility]


   Last Admin: 05/30/19 11:28 Dose:  4 mg


   Documented by: 


Docusate Sodium (Colace)  100 mg PO BID [facility]


   Last Admin: 05/30/19 09:08 Dose:  100 mg


   Documented by: 


Hydralazine HCl (Apresoline)  10 mg IV Q4HR PRN


   PRN Reason: Blood Pressure


Ceftriaxone Sodium (Rocephin/Ns 1 Gm/50 Ml)  1 gm in 50 mls @ 100 mls/hr IV 

Q24HR [facility]; Protocol


   Last Admin: 05/30/19 09:18 Dose:  100 mls/hr


   Documented by: 


Levetiracetam (Keppra)  1,000 mg PO BID [facility]


   Last Admin: 05/30/19 09:07 Dose:  1,000 mg


   Documented by: 


Ondansetron HCl (Zofran)  4 mg IV Q8H PRN


   PRN Reason: Nausea And Vomiting


Pyridoxine HCl (Vitamin B-6)  200 mg PO QDAY [facility]


   Last Admin: 05/30/19 09:11 Dose:  200 mg


   Documented by: 


Sodium Chloride (Sodium Chloride Flush Syringe 10 Ml)  10 ml IV BID [facility]


   Last Admin: 05/30/19 09:08 Dose:  10 ml


   Documented by: 


Sodium Chloride (Sodium Chloride Flush Syringe 10 Ml)  10 ml IV PRN PRN


   PRN Reason: LINE FLUSH











Physical Examination





- Vital Signs


Vital Signs: 


                                   Vital Signs











Temp Pulse Resp BP Pulse Ox


 


 98.5 F   85   16   141/73   99 


 


 05/27/19 00:16  05/27/19 00:16  05/27/19 00:16  05/27/19 00:16  05/27/19 00:16














Results





- Laboratory Findings


CBC and BMP: 


                                 05/28/19 05:41





                                 05/28/19 05:41


Abnormal Lab Findings: 


                                  Abnormal Labs











  05/27/19 05/28/19 05/28/19





  00:58 05:41 05:41


 


WBC  17.7 H  16.8 H 


 


MCV  81 L  80 L 


 


MCH  27 L  27 L 


 


Plt Count   443 H 


 


Mono % (Auto)   7.9 H 


 


[place] #   1.3 H 


 


Seg Neutrophils %   71.1 H 


 


Seg Neutrophils #   12.0 H 


 


Seg Neutrophils # Man  11.7 H  


 


Monocytes # (Manual)  1.1 H  


 


Eosinophils # (Manual)  0.5 H  


 


Sodium    135 L


 


Creatinine    0.6 L


 


Hemoglobin A1c   


 


Albumin    3.2 L


 


HDL Cholesterol    39 L














  05/28/19





  05:41


 


WBC 


 


MCV 


 


MCH 


 


Plt Count 


 


Mono % (Auto) 


 


Mono # 


 


Seg Neutrophils % 


 


Seg Neutrophils # 


 


Seg Neutrophils # Man 


 


Monocytes # (Manual) 


 


Eosinophils # (Manual) 


 


Sodium 


 


Creatinine 


 


Hemoglobin A1c  6.2 H


 


Albumin 


 


HDL Cholesterol 














Assessment and Plan











Mr. Romi hernandes is a 73-year-old male who was admitted 3 days ago for gradual 

development of right upper and lower extremity weakness over a period of 2-3 

days.  After admission workup revealed 3 distinct metastatic lesion in the brain

one in the left frontal and another in the left parietal and an additional one 

in the left occipital lobe with extensive edema however there is no midline 

shift.  Patient has been started on Decadron and CT scan of the chest shows a 

mass in the right upper lobe.  This consult for neurology has been initiated for

a clearance for biopsy of the lung mass





Physical examination.  Patient is alert and awake and answers questions 

appropriately.  He has slurred speech but comprehension is intact.  He knows 

where he is, he knows his family demographics.  Mental status is his baseline 

normal.





Heart.  Normal rate and rhythm





Carotids :  Both palpable.  No bruits.





Cranial nerves.  Patient has mild right facial weakness( upper motor neuron 

type).  Extra ocular movement is intact. Pupils react to light and accommoda

tion.  Facial sensation is normal. Can  swallow and talk.Other Cranial Nerves 

Are Also within Normal Limits.





Motor.  Patient Has weakness of Right Upper and Lower Extremities as Compared to

the Left.  Muscle Tone Is increased on the Right Upper and Lower Extremities. 





Reflexes.  Patient's reflexes are increased on the right upper and lower 

extremities with up going toe on the right side.





Sensory.  Patient's sensory examination is grossly within normal limit.





Coordination.  Finger to nose is normal on the left, however unable to do on the

right.  Was not able to do heel-to-shin.











Impression.  Patient has right bill paresis not from CVA.  Right hemiparesis is 

from metastatic brain tumor.





Recommendation.  Patient should get another MRI today as he has been on Decadron

for a few days, MRI is expected to show improvement in edema. IF edema resolves 

sufficiently I do not see any contra indication from neuro standpoint,

## 2019-05-30 NOTE — PROGRESS NOTE
Assessment and Plan


Assessment and plan: 





Multiple left cerebral masses consistent with metastatic lesions:  Patient with 

lesions involving frontal, parietal and occipital lobes.  No evidence of 

infarction.  Decadron and oncology consulted.  Repeat MRI today.  Neurology 

following





Lung mass suspected Lung cancer with Mets: Bronchoscopy scheduled for Friday per

pulmonary.





Hypertension: low salt diet





Leukocytosis: empiric iv rocephin





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





Hx of EtOH abuse





Hx of Tobacco dependency:  on stopping





DVT prophylaxis on Lovenox





History


Interval history: 


Patient is a 72 yo man with a history of ICH in  secondary to TBI, tobacco 

dependency and alcohol abuse who presents to [facility] ED with c/o altered mental 

status and new right-sided weakness. Old deficits was loss of hearing and 

abnormal speech but no motor deficits per niece Valentina.  CT head without 

contrast revealed Large area of hypoattenuation identified in the upper left 

frontal and temporal lobes, this surrounds a few areas of rounded increased 

attenuation in the high convexity of the posterior left frontal and posterior 

left temporal lobes. The findings may indicate sequelae from subacute to chronic

infarction. The differential also included primary metastatic tumor in this 

region of the left cerebral hemisphere. Further evaluation with advanced cross-

sectional imaging utilizing MRI revealed multiple left cerebral masses which are

consistent with metastatic lesions involving the frontoparietal and occipital 

lobes.  There is a small amount of subcutaneous hemorrhage associated with most 

all of the lesions.  Mass effect in the left cerebral hemisphere but no midline 

shift.  No evidence of acute/subacute infarct.  The patient was started on 

Decadron and oncology consuledt.  Pulmonary also saw the patient in consultation

with regards to likely primary lesion with pulmonary nodule.


No new issues overnight





Hospitalist Physical





- Constitutional


Vitals: 


                                        











Temp Pulse Resp BP Pulse Ox


 


 98.2 F   76   18   125/75   98 


 


 19 07:46  19 07:47  19 07:46  19 07:46  19 07:47











General appearance: Present: no acute distress





- EENT


Eyes: Present: PERRL, EOM intact


ENT: hearing intact, clear oral mucosa, dentition normal





- Neck


Neck: Present: supple, normal ROM





- Respiratory


Respiratory effort: normal


Respiratory: bilateral: CTA





- Cardiovascular


Rhythm: regular


Heart Sounds: Present: S1 & S2.  Absent: gallop, rub





- Extremities


Extremities: no ischemia, No edema, Full ROM





- Abdominal


General gastrointestinal: soft, non-tender, non-distended, normal bowel sounds





- Integumentary


Integumentary: Present: clear, warm, dry





- Neurologic


Neurologic: CNII-XII intact, moves all extremities





Results





- Labs


CBC & Chem 7: 


                                 19 05:41





                                 19 05:41


Labs: 


                             Laboratory Last Values











WBC  16.8 K/mm3 (4.5-11.0)  H  19  05:41    


 


RBC  4.87 M/mm3 (3.65-5.03)   19  05:41    


 


Hgb  12.9 gm/dl (11.8-15.2)   19  05:41    


 


Hct  38.7 % (35.5-45.6)   19  05:41    


 


MCV  80 fl (84-94)  L  19  05:41    


 


MCH  27 pg (28-32)  L  19  05:41    


 


MCHC  33 % (32-34)   19  05:41    


 


RDW  13.8 % (13.2-15.2)   19  05:41    


 


Plt Count  443 K/mm3 (140-440)  H  19  05:41    


 


Lymph % (Auto)  17.7 % (13.4-35.0)   19  05:41    


 


Mono % (Auto)  7.9 % (0.0-7.3)  H  19  05:41    


 


Eos % (Auto)  2.6 % (0.0-4.3)   19  05:41    


 


Baso % (Auto)  0.7 % (0.0-1.8)   19  05:41    


 


Lymph #  3.0 K/mm3 (1.2-5.4)   19  05:41    


 


Mono #  1.3 K/mm3 (0.0-0.8)  H  19  05:41    


 


Eos #  0.4 K/mm3 (0.0-0.4)   19  05:41    


 


Baso #  0.1 K/mm3 (0.0-0.1)   19  05:41    


 


Add Manual Diff  Complete   19  00:58    


 


Total Counted  100   19  00:58    


 


Seg Neutrophils %  71.1 % (40.0-70.0)  H  19  05:41    


 


Seg Neuts % (Manual)  66.0 % (40.0-70.0)   19  00:58    


 


  0 %  19  00:58    


 


  25.0 % (13.4-35.0)   19  00:58    


 


Reactive Lymphs % (Man)  0 %  19  00:58    


 


  6.0 % (0.0-7.3)   19  00:58    


 


  3.0 % (0.0-4.3)   19  00:58    


 


  0 % (0.0-1.8)   19  00:58    


 


  0 %  19  00:58    


 


  0 %  19  00:58    


 


  0 %  19  00:58    


 


  0 %  19  00:58    


 


Nucleated RBC %  Not Reportable   19  00:58    


 


Seg Neutrophils #  12.0 K/mm3 (1.8-7.7)  H  19  05:41    


 


Seg Neutrophils # Man  11.7 K/mm3 (1.8-7.7)  H  19  00:58    


 


Band Neutrophils #  0.0 K/mm3  19  00:58    


 


  4.4 K/mm3 (1.2-5.4)   19  00:58    


 


Abs React Lymphs (Man)  0.0 K/mm3  19  00:58    


 


  1.1 K/mm3 (0.0-0.8)  H  19  00:58    


 


  0.5 K/mm3 (0.0-0.4)  H  19  00:58    


 


  0.0 K/mm3 (0.0-0.1)   19  00:58    


 


  0.0 K/mm3  19  00:58    


 


  0.0 K/mm3  19  00:58    


 


  0.0 K/mm3  19  00:58    


 


Blast Cells #  0.0 K/mm3  19  00:58    


 


WBC Morphology  Not Reportable   19  00:58    


 


WBC Morphology  TNR   19  00:58    


 


Hypersegmented Neuts  Not Reportable   19  00:58    


 


Hyposegmented Neuts  Not Reportable   19  00:58    


 


Hypogranular Neuts  Not Reportable   19  00:58    


 


  Not Reportable   19  00:58    


 


  Not Reportable   19  00:58    


 


  Not Reportable   19  00:58    


 


  Not Reportable   19  00:58    


 


  Not Reportable   19  00:58    


 


  Not Reportable   19  00:58    


 


  Consistent w auto   19  00:58    


 


  Not Reportable   19  00:58    


 


Plt Clumps, EDTA  Not Reportable   19  00:58    


 


  Not Reportable   19  00:58    


 


  Not Reportable   19  00:58    


 


  Not Reportable   19  00:58    


 


Plt Morphology Comment  Not Reportable   19  00:58    


 


RBC Morphology  Not Reportable   19  00:58    


 


Dimorphic RBCs  Not Reportable   19  00:58    


 


  Not Reportable   19  00:58    


 


  Not Reportable   19  00:58    


 


  Not Reportable   19  00:58    


 


  1+   19  00:58    


 


  Not Reportable   19  00:58    


 


  Not Reportable   19  00:58    


 


  Not Reportable   19  00:58    


 


  Not Reportable   19  00:58    


 


  Not Reportable   19  00:58    


 


  Not Reportable   19  00:58    


 


  Not Reportable   19  00:58    


 


  Not Reportable   19  00:58    


 


  Not Reportable   19  00:58    


 


  Not Reportable   19  00:58    


 


  Not Reportable   19  00:58    


 


  Not Reportable   19  00:58    


 


  Not Reportable   19  00:58    


 


  Not Reportable   19  00:58    


 


  Not Reportable   19  00:58    


 


Acanthocytes (Spur)  Not Reportable   19  00:58    


 


Rouleaux  Not Reportable   19  00:58    


 


  Not Reportable   19  00:58    


 


  Not Reportable   19  00:58    


 


  Not Reportable   19  00:58    


 


  Not Reportable   19  00:58    


 


Hem Pathologist Commnt  No   19  00:58    


 


PT  14.5 Sec. (12.2-14.9)   19  00:58    


 


INR  1.06  (0.87-1.13)   19  00:58    


 


APTT  36.0 Sec. (24.2-36.6)   19  00:58    


 


  16.6 Sec. (15.1-19.6)   19  00:58    


 


Sodium  135 mmol/L (137-145)  L  19  05:41    


 


Potassium  3.9 mmol/L (3.6-5.0)   19  05:41    


 


Chloride  99.3 mmol/L ()   19  05:41    


 


Carbon Dioxide  25 mmol/L (22-30)   19  05:41    


 


  15 mmol/L  19  05:41    


 


BUN  11 mg/dL (9-20)   19  05:41    


 


  0.6 mg/dL (0.8-1.5)  L  19  05:41    


 


Estimated GFR  > 60 ml/min  19  05:41    


 


  18 %  19  05:41    


 


Glucose  96 mg/dL ()   19  05:41    


 


POC Glucose  99  ()   19  21:26    


 


  6.2 % (4-6)  H  19  05:41    


 


Lactic Acid  1.90 mmol/L (0.7-2.0)   19  04:35    


 


Calcium  8.7 mg/dL (8.4-10.2)   19  05:41    


 


  0.30 mg/dL (0.1-1.2)   19  05:41    


 


AST  14 units/L (5-40)   19  05:41    


 


ALT  9 units/L (7-56)   19  05:41    


 


  64 units/L ()   19  05:41    


 


  < 0.010 ng/mL (0.00-0.029)   19  00:58    


 


  7.1 g/dL (6.3-8.2)   19  05:41    


 


  3.2 g/dL (3.9-5)  L  19  05:41    


 


  0.8 %  19  05:41    


 


Triglycerides  65 mg/dL (2-149)   19  05:41    


 


Cholesterol  137 mg/dL ()   19  05:41    


 


  87 mg/dL ()   19  05:41    


 


  39 mg/dL (40-59)  L  19  05:41    


 


  3.51 %  19  05:41    














Active Medications





- Current Medications


Current Medications: 














Generic Name Dose Route Start Last Admin





  Trade Name Freq  PRN Reason Stop Dose Admin


 


Acetaminophen  650 mg  19 04:17 





  Tylenol  PO  





  Q4H PRN  





  Pain MILD(1-3)/Fever >100.5/HA  


 


Atorvastatin Calcium  40 mg  19 22:00  19 22:14





  Lipitor  PO   40 mg





  QHS JOSE LUIS   Administration


 


Dexamethasone  4 mg  19 12:00  19 11:28





  Decadron  IV   4 mg





  Q6HR JOSE LUIS   Administration


 


Docusate Sodium  100 mg  19 10:00  19 09:08





  Colace  PO   100 mg





  BID JOSE LUIS   Administration


 


Hydralazine HCl  10 mg  19 04:57 





  Apresoline  IV  





  Q4HR PRN  





  Blood Pressure  


 


Ceftriaxone Sodium  1 gm in 50 mls @ 100 mls/hr  19 06:16  19 09:18





  Rocephin/Ns 1 Gm/50 Ml  IV   100 mls/hr





  Q24HR JOSE LUIS   Administration





  Protocol  


 


Levetiracetam  1,000 mg  19 22:00  19 09:07





  Keppra  PO   1,000 mg





  BID JOSE LUIS   Administration


 


Ondansetron HCl  4 mg  19 04:17 





  Zofran  IV  





  Q8H PRN  





  Nausea And Vomiting  


 


Pyridoxine HCl  200 mg  19 17:00  19 09:11





  Vitamin B-6  PO   200 mg





  QDAY JOSE LUIS   Administration


 


Sodium Chloride  10 ml  19 10:00  19 09:08





  Sodium Chloride Flush Syringe 10 Ml  IV   10 ml





  BID JOSE LUIS   Administration


 


Sodium Chloride  10 ml  19 04:17 





  Sodium Chloride Flush Syringe 10 Ml  IV  





  PRN PRN  





  LINE FLUSH  














Nutrition/Malnutrition Assess





- Dietary Evaluation


Nutrition/Malnutrition Findings: 


                                        





Nutrition Notes                                            Start:  19 

15:53


Freq:                                                      Status: Active       




Protocol:                                                                       




 Document     19 15:53  NEERAJ  (Rec: 19 15:56  NEERAJ  SRW-

[email or web address])


 Nutrition Notes


     Need for Assessment generated from:         MD Order


     Initial or Follow up                        Brief Note


     Other Pertinent Diagnosis                   AMS, ?CVA, Frequent falls


     Current Diet                                Cardiac


     Labs/Tests                                  All are WNL


     Height                                      5 ft 8 in


     Weight                                      72.575 kg


     Ideal Body Weight (kg)                      70.00


     BMI                                         24.3


     Weight Status                               Appropriate


     Subjective/Other Information                RD consulted for diet


                                                 education.  /73 upon


                                                 admission and has not been >


                                                 138/68 since admission.


     Is patient on ventilator?                   No


     Is Patient Ambulatory and/or Out of Bed     No


     REE-([place]-confined to bed)      2360.095


     Calculation Used for Recommendations        [place]


     Additional Notes                            Pro needs 1-1.2g/k-87g/


                                                 day


                                                 Fluid needs 1ml/kcal


 Nutrition Intervention


     Follow-Up By:                               19


     Additional Comments                         F/U: intakes

## 2019-05-30 NOTE — HEM/ONC PROGRESS NOTE
Assessment and Plan





1.  Lung lesion.


2.  Brain lesion, most likely this is metastatic.  The patient is on steroids 

and Keppra.  Radiation Oncology is consulted.


3.  Right-sided hemiparesis.


4.  History of smoking.  Advised to quit.


5.  History of alcohol usage, advised to quit.


6.  History of hypertension.  The patient at this time is on statin.  I will 

follow the patient during inpatient stay.





d/w IR


pulm planning bronch





- Patient Problems


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


Current Visit: Yes   Status: Acute   





Subjective


Date of service: 05/30/19


Principal diagnosis: brain mets


Interval history: 





rt side weakness





Objective





- Constitutional


Vitals: 


                                Last Vital Signs











Temp  98.2 F   05/30/19 07:46


 


Pulse  76   05/30/19 07:47


 


Resp  18   05/30/19 07:46


 


BP  125/75   05/30/19 07:46


 


Pulse Ox  98   05/30/19 07:47











Pain Intensity (0-10): denies any pain


General appearance: no acute distress


Performance status: 4-completely disabled





- EENT


Eyes: EOM intact


ENT: hearing intact


Lymph node exam: negative cervical





- Respiratory


Respiratory effort: Positive: normal


Respiratory: bilateral: CTA





- Cardiovascular


Heart Sounds: Present: S1 & S2


Extremities: no ischemia





- Gastrointestinal


General gastrointestinal: Present: soft, non-tender


Rectal Exam: deferred





- Genitourinary


Male genitourinary: Present: deferred





- Integumentary


Integumentary: clear





- Neurologic


Neurologic: other (rt sided weakmness)





Medications & Allergies





- Medications


Allergies/Adverse Reactions: 


                                    Allergies





No Known Allergies Allergy (Verified 10/19/15 19:57)


   








Active Medications: 














Generic Name Dose Route Start Last Admin





  Trade Name Freq  PRN Reason Stop Dose Admin


 


Acetaminophen  650 mg  05/27/19 04:17 





  Tylenol  PO  





  Q4H PRN  





  Pain MILD(1-3)/Fever >100.5/HA  


 


Atorvastatin Calcium  40 mg  05/27/19 22:00  05/29/19 22:14





  Lipitor  PO   40 mg





  QHS JOSE LUIS   Administration


 


Dexamethasone  4 mg  05/29/19 12:00  05/30/19 06:50





  Decadron  IV   4 mg





  Q6HR JOSE LUIS   Administration


 


Docusate Sodium  100 mg  05/27/19 10:00  05/29/19 22:14





  Colace  PO   100 mg





  BID JOSE LUIS   Administration


 


Hydralazine HCl  10 mg  05/27/19 04:57 





  Apresoline  IV  





  Q4HR PRN  





  Blood Pressure  


 


Ceftriaxone Sodium  1 gm in 50 mls @ 100 mls/hr  05/27/19 06:16  05/29/19 09:44





  Rocephin/Ns 1 Gm/50 Ml  IV   100 mls/hr





  Q24HR JOSE LUIS   Administration





  Protocol  


 


Levetiracetam  1,000 mg  05/27/19 22:00  05/29/19 22:14





  Keppra  PO   1,000 mg





  BID JOSE LUIS   Administration


 


Ondansetron HCl  4 mg  05/27/19 04:17 





  Zofran  IV  





  Q8H PRN  





  Nausea And Vomiting  


 


Pyridoxine HCl  200 mg  05/27/19 17:00  05/29/19 09:46





  Vitamin B-6  PO   200 mg





  QDAY JOSE LUIS   Administration


 


Sodium Chloride  10 ml  05/27/19 10:00  05/29/19 22:15





  Sodium Chloride Flush Syringe 10 Ml  IV   10 ml





  BID JOSE LUIS   Administration


 


Sodium Chloride  10 ml  05/27/19 04:17 





  Sodium Chloride Flush Syringe 10 Ml  IV  





  PRN PRN  





  LINE FLUSH

## 2019-05-30 NOTE — PROGRESS NOTE
Assessment and Plan





Acute stroke. No new events reported. Alone in room


Partial complex seizures


Suspected metastatic brain disease


Left upper lobe mass


COPD





Recommendations





Seizure, Stroke management per neurology.


Albuterol 2.5 mg every 6 hours as needed for shortness of breath or chest 

congestion


FOB planned for Monday, if OK with neurology, can continue ASA if needed, 

patient currently not on Clopidrogel.








 








Subjective


Date of service: 05/30/19


Principal diagnosis: right hemiparesis, stroke, left upper lobe mass


Interval history: 





No events, no new complains. Family at bedside





Objective


                               Vital Signs - 12hr











  05/29/19 05/30/19 05/30/19





  23:55 03:20 05:15


 


Temperature 98.1 F 97.7 F 


 


Pulse Rate 70 69 69


 


Respiratory 17 16 





Rate   


 


Blood Pressure 119/65 120/65 


 


O2 Sat by Pulse 100 91 





Oximetry   














  05/30/19 05/30/19





  07:46 07:47


 


Temperature 98.2 F 


 


Pulse Rate 72 76


 


Respiratory 18 





Rate  


 


Blood Pressure 125/75 


 


O2 Sat by Pulse 99 98





Oximetry  











Constitutional: no acute distress, alert


ENT: oropharynx moist


Neck: supple


Effort: normal


Ascultation: Bilateral: clear, diminished breath sounds


Percussion: Bilateral: not dull


Tactile fremitus: Bilateral: normal


Cardiovascular: regular rate and rhythm


Integumentary: normal


Extremities: no cyanosis, no cyanosis, no cyanosis


Neurologic: other (right hemiparesis)


CBC and BMP: 


                                 05/28/19 05:41





                                 05/28/19 05:41


ABG, PT/INR, D-dimer: 


PT/INR, D-dimer











PT  14.5 Sec. (12.2-14.9)   05/27/19  00:58    


 


INR  1.06  (0.87-1.13)   05/27/19  00:58    








Abnormal lab findings: 


                                  Abnormal Labs











  05/27/19 05/28/19 05/28/19





  00:58 05:41 05:41


 


WBC  17.7 H  16.8 H 


 


MCV  81 L  80 L 


 


MCH  27 L  27 L 


 


Plt Count   443 H 


 


Mono % (Auto)   7.9 H 


 


[place] #   1.3 H 


 


Seg Neutrophils %   71.1 H 


 


Seg Neutrophils #   12.0 H 


 


Seg Neutrophils # Man  11.7 H  


 


Monocytes # (Manual)  1.1 H  


 


Eosinophils # (Manual)  0.5 H  


 


Sodium    135 L


 


Creatinine    0.6 L


 


Hemoglobin A1c   


 


Albumin    3.2 L


 


HDL Cholesterol    39 L














  05/28/19





  05:41


 


WBC 


 


MCV 


 


MCH 


 


Plt Count 


 


Mono % (Auto) 


 


Mono # 


 


Seg Neutrophils % 


 


Seg Neutrophils # 


 


Seg Neutrophils # Man 


 


Monocytes # (Manual) 


 


Eosinophils # (Manual) 


 


Sodium 


 


Creatinine 


 


Hemoglobin A1c  6.2 H


 


Albumin 


 


HDL Cholesterol

## 2019-05-31 RX ADMIN — DOCUSATE SODIUM SCH MG: 100 CAPSULE, LIQUID FILLED ORAL at 21:51

## 2019-05-31 RX ADMIN — LEVETIRACETAM SCH MG: 500 TABLET, FILM COATED ORAL at 21:51

## 2019-05-31 RX ADMIN — DEXAMETHASONE SODIUM PHOSPHATE SCH MG: 4 INJECTION, SOLUTION INTRAMUSCULAR; INTRAVENOUS at 14:21

## 2019-05-31 RX ADMIN — Medication SCH ML: at 11:13

## 2019-05-31 RX ADMIN — DEXAMETHASONE SODIUM PHOSPHATE SCH MG: 4 INJECTION, SOLUTION INTRAMUSCULAR; INTRAVENOUS at 17:52

## 2019-05-31 RX ADMIN — DEXAMETHASONE SODIUM PHOSPHATE SCH MG: 4 INJECTION, SOLUTION INTRAMUSCULAR; INTRAVENOUS at 00:37

## 2019-05-31 RX ADMIN — Medication SCH MG: at 11:13

## 2019-05-31 RX ADMIN — DEXAMETHASONE SODIUM PHOSPHATE SCH MG: 4 INJECTION, SOLUTION INTRAMUSCULAR; INTRAVENOUS at 06:15

## 2019-05-31 RX ADMIN — Medication SCH ML: at 21:51

## 2019-05-31 RX ADMIN — LEVETIRACETAM SCH MG: 500 TABLET, FILM COATED ORAL at 11:12

## 2019-05-31 RX ADMIN — CEFTRIAXONE SODIUM SCH MLS/HR: 1 INJECTION, POWDER, FOR SOLUTION INTRAMUSCULAR; INTRAVENOUS at 11:13

## 2019-05-31 RX ADMIN — DOCUSATE SODIUM SCH MG: 100 CAPSULE, LIQUID FILLED ORAL at 11:12

## 2019-05-31 NOTE — PROGRESS NOTE
Assessment and Plan


Assessment and plan: 





Multiple left cerebral masses consistent with metastatic lesions:  Patient with 

lesions involving frontal, parietal and occipital lobes.  No evidence of 

infarction.  Decadron and oncology consulted.  Repeat MRI pending.  Neurology 

following





Lung mass suspected Lung cancer with Mets: Bronchoscopy scheduled for today but 

on hold pending MRIper pulmonary.





Hypertension: low salt diet





Leukocytosis: empiric iv rocephin





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





Hx of EtOH abuse





Hx of Tobacco dependency:  on stopping





DVT prophylaxis on Lovenox





Disposition.  Physical therapy recommends acute rehabilitation.





History


Interval history: 


Patient is a 74 yo man with a history of ICH in  secondary to TBI, tobacco 

dependency and alcohol abuse who presents to [facility] ED with c/o altered mental 

status and new right-sided weakness. Old deficits was loss of hearing and 

abnormal speech but no motor deficits per niece Valentina.  CT head without 

contrast revealed Large area of hypoattenuation identified in the upper left 

frontal and temporal lobes, this surrounds a few areas of rounded increased 

attenuation in the high convexity of the posterior left frontal and posterior 

left temporal lobes. The findings may indicate sequelae from subacute to chronic

infarction. The differential also included primary metastatic tumor in this 

region of the left cerebral hemisphere. Further evaluation with advanced cross-

sectional imaging utilizing MRI revealed multiple left cerebral masses which are

consistent with metastatic lesions involving the frontoparietal and occipital 

lobes.  There is a small amount of subcutaneous hemorrhage associated with most 

all of the lesions.  Mass effect in the left cerebral hemisphere but no midline 

shift.  No evidence of acute/subacute infarct.  The patient was started on 

Decadron and oncology consuledt.  Pulmonary also saw the patient in consultation

with regards to likely primary lesion with pulmonary nodule.


No new issues overnight





Hospitalist Physical





- Constitutional


Vitals: 


                                        











Temp Pulse Resp BP Pulse Ox


 


 98.3 F   66   16   99/50   95 


 


 19 08:10  19 08:10  19 08:10  19 08:10  19 08:10











General appearance: Present: no acute distress





- EENT


Eyes: Present: PERRL, EOM intact


ENT: hearing intact, clear oral mucosa, dentition normal





- Neck


Neck: Present: supple, normal ROM





- Respiratory


Respiratory effort: normal


Respiratory: bilateral: CTA





- Cardiovascular


Rhythm: regular


Heart Sounds: Present: S1 & S2.  Absent: gallop, rub





- Extremities


Extremities: no ischemia, No edema, Full ROM





- Abdominal


General gastrointestinal: soft, non-tender, non-distended, normal bowel sounds





- Integumentary


Integumentary: Present: clear, warm, dry





- Neurologic


Neurologic: CNII-XII intact, moves all extremities





Results





- Labs


CBC & Chem 7: 


                                 19 05:41





                                 19 05:41


Labs: 


                             Laboratory Last Values











WBC  16.8 K/mm3 (4.5-11.0)  H  19  05:41    


 


RBC  4.87 M/mm3 (3.65-5.03)   19  05:41    


 


Hgb  12.9 gm/dl (11.8-15.2)   19  05:41    


 


Hct  38.7 % (35.5-45.6)   19  05:41    


 


MCV  80 fl (84-94)  L  19  05:41    


 


MCH  27 pg (28-32)  L  19  05:41    


 


MCHC  33 % (32-34)   19  05:41    


 


RDW  13.8 % (13.2-15.2)   19  05:41    


 


Plt Count  443 K/mm3 (140-440)  H  19  05:41    


 


Lymph % (Auto)  17.7 % (13.4-35.0)   19  05:41    


 


Mono % (Auto)  7.9 % (0.0-7.3)  H  19  05:41    


 


Eos % (Auto)  2.6 % (0.0-4.3)   19  05:41    


 


Baso % (Auto)  0.7 % (0.0-1.8)   19  05:41    


 


Lymph #  3.0 K/mm3 (1.2-5.4)   19  05:41    


 


Mono #  1.3 K/mm3 (0.0-0.8)  H  19  05:41    


 


Eos #  0.4 K/mm3 (0.0-0.4)   19  05:41    


 


Baso #  0.1 K/mm3 (0.0-0.1)   19  05:41    


 


Add Manual Diff  Complete   19  00:58    


 


Total Counted  100   19  00:58    


 


Seg Neutrophils %  71.1 % (40.0-70.0)  H  19  05:41    


 


Seg Neuts % (Manual)  66.0 % (40.0-70.0)   19  00:58    


 


  0 %  19  00:58    


 


  25.0 % (13.4-35.0)   19  00:58    


 


Reactive Lymphs % (Man)  0 %  19  00:58    


 


  6.0 % (0.0-7.3)   19  00:58    


 


  3.0 % (0.0-4.3)   19  00:58    


 


  0 % (0.0-1.8)   19  00:58    


 


  0 %  19  00:58    


 


  0 %  19  00:58    


 


  0 %  19  00:58    


 


  0 %  19  00:58    


 


Nucleated RBC %  Not Reportable   19  00:58    


 


Seg Neutrophils #  12.0 K/mm3 (1.8-7.7)  H  19  05:41    


 


Seg Neutrophils # Man  11.7 K/mm3 (1.8-7.7)  H  19  00:58    


 


Band Neutrophils #  0.0 K/mm3  19  00:58    


 


  4.4 K/mm3 (1.2-5.4)   19  00:58    


 


Abs React Lymphs (Man)  0.0 K/mm3  19  00:58    


 


  1.1 K/mm3 (0.0-0.8)  H  19  00:58    


 


  0.5 K/mm3 (0.0-0.4)  H  19  00:58    


 


  0.0 K/mm3 (0.0-0.1)   19  00:58    


 


  0.0 K/mm3  19  00:58    


 


  0.0 K/mm3  19  00:58    


 


  0.0 K/mm3  19  00:58    


 


Blast Cells #  0.0 K/mm3  19  00:58    


 


WBC Morphology  Not Reportable   19  00:58    


 


WBC Morphology  TNR   19  00:58    


 


Hypersegmented Neuts  Not Reportable   19  00:58    


 


Hyposegmented Neuts  Not Reportable   19  00:58    


 


Hypogranular Neuts  Not Reportable   19  00:58    


 


  Not Reportable   19  00:58    


 


  Not Reportable   19  00:58    


 


  Not Reportable   19  00:58    


 


  Not Reportable   19  00:58    


 


  Not Reportable   19  00:58    


 


  Not Reportable   19  00:58    


 


  Consistent w auto   19  00:58    


 


  Not Reportable   19  00:58    


 


Plt Clumps, EDTA  Not Reportable   19  00:58    


 


  Not Reportable   19  00:58    


 


  Not Reportable   19  00:58    


 


  Not Reportable   19  00:58    


 


Plt Morphology Comment  Not Reportable   19  00:58    


 


RBC Morphology  Not Reportable   19  00:58    


 


Dimorphic RBCs  Not Reportable   19  00:58    


 


  Not Reportable   19  00:58    


 


  Not Reportable   19  00:58    


 


  Not Reportable   19  00:58    


 


  1+   19  00:58    


 


  Not Reportable   19  00:58    


 


  Not Reportable   19  00:58    


 


  Not Reportable   19  00:58    


 


  Not Reportable   19  00:58    


 


  Not Reportable   19  00:58    


 


  Not Reportable   19  00:58    


 


  Not Reportable   19  00:58    


 


  Not Reportable   19  00:58    


 


  Not Reportable   19  00:58    


 


  Not Reportable   19  00:58    


 


  Not Reportable   19  00:58    


 


  Not Reportable   19  00:58    


 


  Not Reportable   19  00:58    


 


  Not Reportable   19  00:58    


 


  Not Reportable   19  00:58    


 


Acanthocytes (Spur)  Not Reportable   19  00:58    


 


Rouleaux  Not Reportable   19  00:58    


 


  Not Reportable   19  00:58    


 


  Not Reportable   19  00:58    


 


  Not Reportable   19  00:58    


 


  Not Reportable   19  00:58    


 


Hem Pathologist Commnt  No   19  00:58    


 


PT  14.5 Sec. (12.2-14.9)   19  00:58    


 


INR  1.06  (0.87-1.13)   19  00:58    


 


APTT  36.0 Sec. (24.2-36.6)   19  00:58    


 


  16.6 Sec. (15.1-19.6)   19  00:58    


 


Sodium  135 mmol/L (137-145)  L  19  05:41    


 


Potassium  3.9 mmol/L (3.6-5.0)   19  05:41    


 


Chloride  99.3 mmol/L ()   19  05:41    


 


Carbon Dioxide  25 mmol/L (22-30)   19  05:41    


 


  15 mmol/L  19  05:41    


 


BUN  11 mg/dL (9-20)   19  05:41    


 


  0.6 mg/dL (0.8-1.5)  L  19  05:41    


 


Estimated GFR  > 60 ml/min  19  05:41    


 


  18 %  19  05:41    


 


Glucose  96 mg/dL ()   19  05:41    


 


POC Glucose  99  ()   19  21:26    


 


  6.2 % (4-6)  H  19  05:41    


 


Lactic Acid  1.90 mmol/L (0.7-2.0)   19  04:35    


 


Calcium  8.7 mg/dL (8.4-10.2)   19  05:41    


 


  0.30 mg/dL (0.1-1.2)   19  05:41    


 


AST  14 units/L (5-40)   19  05:41    


 


ALT  9 units/L (7-56)   19  05:41    


 


  64 units/L ()   19  05:41    


 


  < 0.010 ng/mL (0.00-0.029)   19  00:58    


 


  7.1 g/dL (6.3-8.2)   19  05:41    


 


  3.2 g/dL (3.9-5)  L  19  05:41    


 


  0.8 %  19  05:41    


 


Triglycerides  65 mg/dL (2-149)   19  05:41    


 


Cholesterol  137 mg/dL ()   19  05:41    


 


  87 mg/dL ()   19  05:41    


 


  39 mg/dL (40-59)  L  19  05:41    


 


  3.51 %  19  05:41    














Active Medications





- Current Medications


Current Medications: 














Generic Name Dose Route Start Last Admin





  Trade Name Freq  PRN Reason Stop Dose Admin


 


Acetaminophen  650 mg  19 04:17 





  Tylenol  PO  





  Q4H PRN  





  Pain MILD(1-3)/Fever >100.5/HA  


 


Atorvastatin Calcium  40 mg  19 22:00  19 22:42





  Lipitor  PO   40 mg





  QHS JOSE LUIS   Administration


 


Dexamethasone  4 mg  19 12:00  19 06:15





  Decadron  IV   4 mg





  Q6HR JOSE LUIS   Administration


 


Docusate Sodium  100 mg  19 10:00  19 22:42





  Colace  PO   100 mg





  BID JOSE LUIS   Administration


 


Hydralazine HCl  10 mg  19 04:57 





  Apresoline  IV  





  Q4HR PRN  





  Blood Pressure  


 


Ceftriaxone Sodium  1 gm in 50 mls @ 100 mls/hr  19 06:16  19 09:18





  Rocephin/Ns 1 Gm/50 Ml  IV   100 mls/hr





  Q24HR JOSE LUIS   Administration





  Protocol  


 


Levetiracetam  1,000 mg  19 22:00  19 22:42





  Keppra  PO   1,000 mg





  BID JOSE LUIS   Administration


 


Ondansetron HCl  4 mg  19 04:17 





  Zofran  IV  





  Q8H PRN  





  Nausea And Vomiting  


 


Pyridoxine HCl  200 mg  19 17:00  19 09:11





  Vitamin B-6  PO   200 mg





  QDAY JOSE LUIS   Administration


 


Sodium Chloride  10 ml  19 10:00  19 22:42





  Sodium Chloride Flush Syringe 10 Ml  IV   10 ml





  BID JOSE LUIS   Administration


 


Sodium Chloride  10 ml  19 04:17 





  Sodium Chloride Flush Syringe 10 Ml  IV  





  PRN PRN  





  LINE FLUSH  














Nutrition/Malnutrition Assess





- Dietary Evaluation


Nutrition/Malnutrition Findings: 


                                        





Nutrition Notes                                            Start:  19 

15:53


Freq:                                                      Status: Active       




Protocol:                                                                       




 Document     19 15:53  NEERAJ  (Rec: 19 15:56  NEERAJ  SRW-

[id number])


 Nutrition Notes


     Need for Assessment generated from:         MD Order


     Initial or Follow up                        Brief Note


     Other Pertinent Diagnosis                   AMS, ?CVA, Frequent falls


     Current Diet                                Cardiac


     Labs/Tests                                  All are WNL


     Height                                      5 ft 8 in


     Weight                                      72.575 kg


     Ideal Body Weight (kg)                      70.00


     BMI                                         24.3


     Weight Status                               Appropriate


     Subjective/Other Information                RD consulted for diet


                                                 education.  /73 upon


                                                 admission and has not been >


                                                 138/68 since admission.


     Is patient on ventilator?                   No


     Is Patient Ambulatory and/or Out of Bed     No


     REE-(Shelby-St. Jeor-confined to bed)      1734.528


     Calculation Used for Recommendations        Shelby-St Jeor


     Additional Notes                            Pro needs 1-1.2g/k-87g/


                                                 day


                                                 Fluid needs 1ml/kcal


 Nutrition Intervention


     Follow-Up By:                               19


     Additional Comments                         F/U: intakes

## 2019-05-31 NOTE — MAGNETIC RESONANCE REPORT
PROCEDURE:  MRI brain without contrast. 

 

TECHNIQUE:  Magnetic resonance imaging of the brain was performed without contrast material.  

 

HISTORY: f/u edema,comment on improvement 

 

COMPARISONS:  None. 

 

FINDINGS: 

 

There are 7 focal masses within the left cerebral hemisphere. These are much better visualized on the
 previous contrast enhanced study. There are present in the left frontal lobe, left parietal lobe and
 left occipital lobe. The largest masses are in the posterior portion of the left frontal lobe and th
e left parietal lobe. There is adjacent vasogenic edema to these masses. I cannot detect any definite
 difference in the appearance of the vasogenic edema from the previous study. There are no new lesion
s. There is no intracranial hemorrhage. There is moderate cerebral atrophy. The mastoid air cells and
 paranasal sinuses are clear. 

 

IMPRESSION: 7 focal masses in the left cerebral hemisphere consistent with metastatic disease. No def
inite change in vasogenic edema. 

 

This document is electronically signed by Nathan Ruiz MD., May 31 2019 06:03:48 PM ET

## 2019-05-31 NOTE — PROGRESS NOTE
Assessment and Plan





Acute stroke. No new events reported. 


Partial complex seizures


Suspected metastatic brain disease


Left upper lobe mass


COPD





Recommendations





Follow-up MRI per neurology.


Albuterol 2.5 mg every 6 hours as needed for shortness of breath or chest 

congestion


FOB planned for today, but on hold pending MRI.  Also I was notified that study 

could not be scheduled for today due to time/staff limitations.  Appreciate 

neurology input





Discussed with patient's family in detail.  All questions answered.








 








Subjective


Principal diagnosis: brain mets


Interval history: 





No respiratory complains





Objective


                               Vital Signs - 12hr











  05/30/19 05/31/19 05/31/19





  22:00 00:29 05:06


 


Temperature  98.4 F 97.6 F


 


Pulse Rate  70 63


 


Respiratory 20 20 20





Rate   


 


Blood Pressure  107/57 109/56


 


O2 Sat by Pulse  93 95





Oximetry   














  05/31/19





  08:10


 


Temperature 98.3 F


 


Pulse Rate 66


 


Respiratory 16





Rate 


 


Blood Pressure 99/50


 


O2 Sat by Pulse 95





Oximetry 











Constitutional: no acute distress, alert


ENT: oropharynx moist


Neck: supple


Effort: normal


Ascultation: Bilateral: clear, diminished breath sounds


Percussion: Bilateral: not dull


Tactile fremitus: Bilateral: normal


Cardiovascular: regular rate and rhythm


Integumentary: normal


Extremities: no cyanosis, no cyanosis, no cyanosis


Neurologic: other (right hemiparesis)


CBC and BMP: 


                                 05/28/19 05:41





                                 05/28/19 05:41


ABG, PT/INR, D-dimer: 


PT/INR, D-dimer











PT  14.5 Sec. (12.2-14.9)   05/27/19  00:58    


 


INR  1.06  (0.87-1.13)   05/27/19  00:58    








Abnormal lab findings: 


                                  Abnormal Labs











  05/27/19 05/28/19 05/28/19





  00:58 05:41 05:41


 


WBC  17.7 H  16.8 H 


 


MCV  81 L  80 L 


 


MCH  27 L  27 L 


 


Plt Count   443 H 


 


Mono % (Auto)   7.9 H 


 


Cameron #   1.3 H 


 


Seg Neutrophils %   71.1 H 


 


Seg Neutrophils #   12.0 H 


 


Seg Neutrophils # Man  11.7 H  


 


Monocytes # (Manual)  1.1 H  


 


Eosinophils # (Manual)  0.5 H  


 


Sodium    135 L


 


Creatinine    0.6 L


 


Hemoglobin A1c   


 


Albumin    3.2 L


 


HDL Cholesterol    39 L














  05/28/19





  05:41


 


WBC 


 


MCV 


 


MCH 


 


Plt Count 


 


Mono % (Auto) 


 


Mono # 


 


Seg Neutrophils % 


 


Seg Neutrophils # 


 


Seg Neutrophils # Man 


 


Monocytes # (Manual) 


 


Eosinophils # (Manual) 


 


Sodium 


 


Creatinine 


 


Hemoglobin A1c  6.2 H


 


Albumin 


 


HDL Cholesterol

## 2019-05-31 NOTE — HEM/ONC PROGRESS NOTE
Assessment and Plan





1.  Lung lesion.


2.  Brain lesion, most likely this is metastatic.  The patient is on steroids 

and Keppra.  Radiation Oncology is consulted.


3.  Right-sided hemiparesis.


4.  History of smoking.  Advised to quit.


5.  History of alcohol usage, advised to quit.


6.  History of hypertension.  The patient at this time is on statin.  I will 

follow the patient during inpatient stay.





d/w IR


pulm evaluating for bronch





- Patient Problems


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


Current Visit: Yes   Status: Acute   





Subjective


Date of service: 05/31/19


Principal diagnosis: lung lesion and brain lesions


Interval history: 





rt sided weakness





Objective





- Constitutional


Vitals: 


                                Last Vital Signs











Temp  98.3 F   05/31/19 08:10


 


Pulse  66   05/31/19 08:10


 


Resp  16   05/31/19 08:10


 


BP  99/50   05/31/19 08:10


 


Pulse Ox  95   05/31/19 08:10











Pain Intensity (0-10): denies any pain


General appearance: no acute distress


Performance status: 3-limited selfcare





- EENT


Eyes: EOM intact


ENT: hearing intact


Lymph node exam: negative cervical





- Neck


Neck: normal ROM





- Respiratory


Respiratory effort: Positive: normal


Respiratory: bilateral: CTA (anteriorly)





- Cardiovascular


Heart Sounds: Present: S1 & S2


Extremities: normal temperature





- Gastrointestinal


General gastrointestinal: Present: soft, non-tender


Rectal Exam: deferred





- Genitourinary


Male genitourinary: Present: deferred





- Integumentary


Integumentary: warm





- Musculoskeletal


Musculoskeletal: right sided weakness





- Neurologic


Neurologic: focal deficits





Medications & Allergies





- Medications


Allergies/Adverse Reactions: 


                                    Allergies





No Known Allergies Allergy (Verified 10/19/15 19:57)


   








Active Medications: 














Generic Name Dose Route Start Last Admin





  Trade Name Freq  PRN Reason Stop Dose Admin


 


Acetaminophen  650 mg  05/27/19 04:17 





  Tylenol  PO  





  Q4H PRN  





  Pain MILD(1-3)/Fever >100.5/HA  


 


Atorvastatin Calcium  40 mg  05/27/19 22:00  05/30/19 22:42





  Lipitor  PO   40 mg





  QHS JOSE LUIS   Administration


 


Dexamethasone  4 mg  05/29/19 12:00  05/31/19 06:15





  Decadron  IV   4 mg





  Q6HR JOSE LUIS   Administration


 


Docusate Sodium  100 mg  05/27/19 10:00  05/30/19 22:42





  Colace  PO   100 mg





  BID JOSE LUIS   Administration


 


Hydralazine HCl  10 mg  05/27/19 04:57 





  Apresoline  IV  





  Q4HR PRN  





  Blood Pressure  


 


Ceftriaxone Sodium  1 gm in 50 mls @ 100 mls/hr  05/27/19 06:16  05/30/19 09:18





  Rocephin/Ns 1 Gm/50 Ml  IV   100 mls/hr





  Q24HR JOSE LUIS   Administration





  Protocol  


 


Levetiracetam  1,000 mg  05/27/19 22:00  05/30/19 22:42





  Keppra  PO   1,000 mg





  BID JOSE LUIS   Administration


 


Ondansetron HCl  4 mg  05/27/19 04:17 





  Zofran  IV  





  Q8H PRN  





  Nausea And Vomiting  


 


Pyridoxine HCl  200 mg  05/27/19 17:00  05/30/19 09:11





  Vitamin B-6  PO   200 mg





  QDAY JOSE LUIS   Administration


 


Sodium Chloride  10 ml  05/27/19 10:00  05/30/19 22:42





  Sodium Chloride Flush Syringe 10 Ml  IV   10 ml





  BID JOSE LUIS   Administration


 


Sodium Chloride  10 ml  05/27/19 04:17 





  Sodium Chloride Flush Syringe 10 Ml  IV  





  PRN PRN  





  LINE FLUSH

## 2019-05-31 NOTE — CONSULTATION
REFERRED BY:  Dr. Rios.



REASON FOR CONSULTATION:  Brain lesions.



HISTORY OF PRESENT ILLNESS:  I saw the patient, a 73-year-old 

male, in the medical floor.  The patient has history of intracranial hemorrhage

in 2008 secondary to injury.  He has also history of alcohol usage.  The patient

lives with his niece.  He came to the hospital because of right-sided weakness

and altered mentation.  The patient has been experiencing frequent falls at

home.  During this admission, the patient was found to have multiple brain

lesions and lung lesion.  I have been asked to evaluate the patient for this. 

At this time, no headache, no visual disturbances.  No ear discharge, no chest

pain, no palpitation, no abdominal pain, no vomiting, no diarrhea, no dysuria. 

The patient has a history of weight loss.  No hemoptysis.  History of smoking

present.  The patient has right-sided weakness.



PAST MEDICAL HISTORY:  As above, traumatic brain injury, intracranial bleed

2008.



SOCIAL HISTORY:  History of smoking present, history of alcohol use present.



ALLERGIES:  None.



PRESENT MEDICATIONS:  Include Tylenol, atorvastatin, dexamethasone, hydralazine,

Keppra.



PHYSICAL EXAMINATION:

VITAL SIGNS:  Temperature 98, pulse 78, respirations 17, /65.

HEENT:  No pallor, no icterus.

NECK:  No neck lymph nodes.

HEART:  S1, S2.

LUNGS:  Clear to auscultation anteriorly.

ABDOMEN:  Soft.

EXTREMITIES:  No calf tenderness.  Right-sided weakness present.

NEUROLOGIC:  Alert, awake, follows simple commands.



LABORATORY DATA:  White cells 16, hemoglobin 12, MCV 80, platelet 443, potassium

3.9, creatinine 0.6, calcium 8.7, bilirubin 0.3.



RADIOLOGY:  MRI brain shows multiple at least 5 left cerebral masses involving

frontal, parietal and occipital lobe, small amount of subacute or chronic

hemorrhage, mass effect present.  No midline shift.  CT chest; ____ mass, left

upper lobe, worrisome for malignancy, 3 x 2 cm.



ASSESSMENT:

1.  Lung lesion.

2.  Brain lesion, most likely this is metastatic.  The patient is on steroids

and Keppra.  Radiation Oncology is consulted.

3.  Right-sided hemiparesis.

4.  History of smoking.  Advised to quit.

5.  History of alcohol usage, advised to quit.

6.  History of hypertension.  The patient at this time is on statin.  I will

follow the patient during inpatient stay.





DD: 05/30/2019 07:56

DT: 05/31/2019 03:38

JOB# [id number]  [id number]

NM/KEITH

## 2019-06-01 RX ADMIN — DOCUSATE SODIUM SCH MG: 100 CAPSULE, LIQUID FILLED ORAL at 10:41

## 2019-06-01 RX ADMIN — DEXAMETHASONE SODIUM PHOSPHATE SCH MG: 4 INJECTION, SOLUTION INTRAMUSCULAR; INTRAVENOUS at 12:07

## 2019-06-01 RX ADMIN — DOCUSATE SODIUM SCH MG: 100 CAPSULE, LIQUID FILLED ORAL at 21:35

## 2019-06-01 RX ADMIN — DEXAMETHASONE SODIUM PHOSPHATE SCH MG: 4 INJECTION, SOLUTION INTRAMUSCULAR; INTRAVENOUS at 00:13

## 2019-06-01 RX ADMIN — Medication SCH ML: at 21:36

## 2019-06-01 RX ADMIN — DEXAMETHASONE SODIUM PHOSPHATE SCH MG: 4 INJECTION, SOLUTION INTRAMUSCULAR; INTRAVENOUS at 23:57

## 2019-06-01 RX ADMIN — CEFTRIAXONE SODIUM SCH MLS/HR: 1 INJECTION, POWDER, FOR SOLUTION INTRAMUSCULAR; INTRAVENOUS at 10:41

## 2019-06-01 RX ADMIN — LEVETIRACETAM SCH MG: 500 TABLET, FILM COATED ORAL at 21:35

## 2019-06-01 RX ADMIN — Medication SCH MG: at 10:40

## 2019-06-01 RX ADMIN — LEVETIRACETAM SCH MG: 500 TABLET, FILM COATED ORAL at 10:41

## 2019-06-01 RX ADMIN — DEXAMETHASONE SODIUM PHOSPHATE SCH MG: 4 INJECTION, SOLUTION INTRAMUSCULAR; INTRAVENOUS at 17:20

## 2019-06-01 RX ADMIN — DEXAMETHASONE SODIUM PHOSPHATE SCH MG: 4 INJECTION, SOLUTION INTRAMUSCULAR; INTRAVENOUS at 05:55

## 2019-06-01 RX ADMIN — Medication SCH ML: at 10:42

## 2019-06-01 NOTE — PROGRESS NOTE
Assessment and Plan





Acute stroke. No new events reported. 


Partial complex seizures


Suspected metastatic brain disease.  7 mass lesions noted on follow-up MRI 

scanning, on change bringing edema


Left upper lobe mass


COPD





Recommendations





Check: Follow-up post MRI per neurology comments.


Albuterol 2.5 mg every 6 hours as needed for shortness of breath or chest 

congestion


FOB rescheduled for Monday, preliminarily, if okay after follow-up neurology 

opinion and family and patient in agreement.





Discussed with patient's family in detail.  All questions answered.








 








Subjective


Date of service: 06/01/19


Principal diagnosis: brain mets, brain edema, hemiparesis, lung mass


Interval history: 





No respiratory complains





Objective


                               Vital Signs - 12hr











  05/31/19 05/31/19 06/01/19





  22:00 23:24 04:13


 


Temperature  97.6 F 97.9 F


 


Pulse Rate  62 61


 


Respiratory 20 16 19





Rate   


 


Blood Pressure  117/69 105/56


 


O2 Sat by Pulse  96 98





Oximetry   














  06/01/19 06/01/19





  05:00 07:58


 


Temperature  98.2 F


 


Pulse Rate 58 L 62


 


Respiratory  16





Rate  


 


Blood Pressure  113/60


 


O2 Sat by Pulse  97





Oximetry  











Constitutional: no acute distress, alert


ENT: oropharynx moist


Neck: supple


Effort: normal


Ascultation: Bilateral: clear, diminished breath sounds


Percussion: Bilateral: not dull


Tactile fremitus: Bilateral: normal


Cardiovascular: regular rate and rhythm


Integumentary: normal


Extremities: no cyanosis, no cyanosis, no cyanosis


Neurologic: other (right hemiparesis)


CBC and BMP: 


                                 05/28/19 05:41





                                 05/28/19 05:41


ABG, PT/INR, D-dimer: 


PT/INR, D-dimer











PT  14.5 Sec. (12.2-14.9)   05/27/19  00:58    


 


INR  1.06  (0.87-1.13)   05/27/19  00:58    








Abnormal lab findings: 


                                  Abnormal Labs











  05/27/19 05/28/19 05/28/19





  00:58 05:41 05:41


 


WBC  17.7 H  16.8 H 


 


MCV  81 L  80 L 


 


MCH  27 L  27 L 


 


Plt Count   443 H 


 


Mono % (Auto)   7.9 H 


 


Latimer #   1.3 H 


 


Seg Neutrophils %   71.1 H 


 


Seg Neutrophils #   12.0 H 


 


Seg Neutrophils # Man  11.7 H  


 


Monocytes # (Manual)  1.1 H  


 


Eosinophils # (Manual)  0.5 H  


 


Sodium    135 L


 


Creatinine    0.6 L


 


Hemoglobin A1c   


 


Albumin    3.2 L


 


HDL Cholesterol    39 L














  05/28/19





  05:41


 


WBC 


 


MCV 


 


MCH 


 


Plt Count 


 


Mono % (Auto) 


 


Mono # 


 


Seg Neutrophils % 


 


Seg Neutrophils # 


 


Seg Neutrophils # Man 


 


Monocytes # (Manual) 


 


Eosinophils # (Manual) 


 


Sodium 


 


Creatinine 


 


Hemoglobin A1c  6.2 H


 


Albumin 


 


HDL Cholesterol

## 2019-06-01 NOTE — PROGRESS NOTE
Assessment and Plan


Assessment and plan: 





Multiple left cerebral masses consistent with metastatic lesions:  Patient with 

lesions involving frontal, parietal and occipital lobes.  No evidence of 

infarction.  Continue Decadron.  Oncology and pulmonary following.  Repeat MRI 

revealed 7 mass lesions 





Lung mass suspected Lung cancer with Mets: FOB rescheduled for Monday per 

pulmonary





Hypertension: low salt diet





Leukocytosis: empiric iv rocephin





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





Hx of EtOH abuse





Hx of Tobacco dependency:  on stopping





DVT prophylaxis. SCDs.  Lovenox held due to hemorrhage with lesions.





Disposition.  Physical therapy recommends acute rehabilitation.





History


Interval history: 


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 

dependency and alcohol abuse who presents to [facility] ED with c/o altered mental 

status and new right-sided weakness. Old deficits was loss of hearing and abnor

mal speech but no motor deficits per niece Valentina.  CT head without contrast 

revealed Large area of hypoattenuation identified in the upper left frontal and 

temporal lobes, this surrounds a few areas of rounded increased attenuation in 

the high convexity of the posterior left frontal and posterior left temporal 

lobes. The findings may indicate sequelae from subacute to chronic infarction. 

The differential also included primary metastatic tumor in this region of the 

left cerebral hemisphere. Further evaluation with advanced cross-sectional 

imaging utilizing MRI revealed multiple left cerebral masses which are 

consistent with metastatic lesions involving the frontoparietal and occipital 

lobes.  There is a small amount of subcutaneous hemorrhage associated with most 

all of the lesions.  Mass effect in the left cerebral hemisphere but no midline 

shift.  No evidence of acute/subacute infarct.  The patient was started on 

Decadron and oncology consuledt.  Pulmonary also saw the patient in consultation

with regards to likely primary lesion with pulmonary nodule.


No new issues overnight





Hospitalist Physical





- Constitutional


Vitals: 


                                        











Temp Pulse Resp BP Pulse Ox


 


 98.2 F   62   16   113/60   97 


 


 06/01/19 07:58  06/01/19 07:58  06/01/19 07:58  06/01/19 07:58  06/01/19 07:58











General appearance: Present: no acute distress





- EENT


Eyes: Present: PERRL, EOM intact


ENT: hearing intact, clear oral mucosa, dentition normal





- Neck


Neck: Present: supple, normal ROM





- Respiratory


Respiratory effort: normal


Respiratory: bilateral: CTA





- Cardiovascular


Rhythm: regular


Heart Sounds: Present: S1 & S2.  Absent: gallop, rub





- Extremities


Extremities: no ischemia, No edema, Full ROM





- Abdominal


General gastrointestinal: soft, non-tender, non-distended, normal bowel sounds





- Integumentary


Integumentary: Present: clear, warm, dry





- Neurologic


Neurologic: CNII-XII intact, moves all extremities





Results





- Labs


CBC & Chem 7: 


                                 05/28/19 05:41





                                 05/28/19 05:41


Labs: 


                             Laboratory Last Values











WBC  16.8 K/mm3 (4.5-11.0)  H  05/28/19  05:41    


 


RBC  4.87 M/mm3 (3.65-5.03)   05/28/19  05:41    


 


Hgb  12.9 gm/dl (11.8-15.2)   05/28/19  05:41    


 


Hct  38.7 % (35.5-45.6)   05/28/19  05:41    


 


MCV  80 fl (84-94)  L  05/28/19  05:41    


 


MCH  27 pg (28-32)  L  05/28/19  05:41    


 


MCHC  33 % (32-34)   05/28/19  05:41    


 


RDW  13.8 % (13.2-15.2)   05/28/19  05:41    


 


Plt Count  443 K/mm3 (140-440)  H  05/28/19  05:41    


 


Lymph % (Auto)  17.7 % (13.4-35.0)   05/28/19  05:41    


 


Mono % (Auto)  7.9 % (0.0-7.3)  H  05/28/19  05:41    


 


Eos % (Auto)  2.6 % (0.0-4.3)   05/28/19  05:41    


 


Baso % (Auto)  0.7 % (0.0-1.8)   05/28/19  05:41    


 


Lymph #  3.0 K/mm3 (1.2-5.4)   05/28/19  05:41    


 


Mono #  1.3 K/mm3 (0.0-0.8)  H  05/28/19  05:41    


 


Eos #  0.4 K/mm3 (0.0-0.4)   05/28/19  05:41    


 


Baso #  0.1 K/mm3 (0.0-0.1)   05/28/19  05:41    


 


Add Manual Diff  Complete   05/27/19  00:58    


 


Total Counted  100   05/27/19  00:58    


 


Seg Neutrophils %  71.1 % (40.0-70.0)  H  05/28/19  05:41    


 


Seg Neuts % (Manual)  66.0 % (40.0-70.0)   05/27/19  00:58    


 


  0 %  05/27/19  00:58    


 


  25.0 % (13.4-35.0)   05/27/19  00:58    


 


Reactive Lymphs % (Man)  0 %  05/27/19  00:58    


 


  6.0 % (0.0-7.3)   05/27/19  00:58    


 


  3.0 % (0.0-4.3)   05/27/19  00:58    


 


  0 % (0.0-1.8)   05/27/19  00:58    


 


  0 %  05/27/19  00:58    


 


  0 %  05/27/19  00:58    


 


  0 %  05/27/19  00:58    


 


  0 %  05/27/19  00:58    


 


Nucleated RBC %  Not Reportable   05/27/19  00:58    


 


Seg Neutrophils #  12.0 K/mm3 (1.8-7.7)  H  05/28/19  05:41    


 


Seg Neutrophils # Man  11.7 K/mm3 (1.8-7.7)  H  05/27/19  00:58    


 


Band Neutrophils #  0.0 K/mm3  05/27/19  00:58    


 


  4.4 K/mm3 (1.2-5.4)   05/27/19  00:58    


 


Abs React Lymphs (Man)  0.0 K/mm3  05/27/19  00:58    


 


  1.1 K/mm3 (0.0-0.8)  H  05/27/19  00:58    


 


  0.5 K/mm3 (0.0-0.4)  H  05/27/19  00:58    


 


  0.0 K/mm3 (0.0-0.1)   05/27/19  00:58    


 


  0.0 K/mm3  05/27/19  00:58    


 


  0.0 K/mm3  05/27/19  00:58    


 


  0.0 K/mm3  05/27/19  00:58    


 


Blast Cells #  0.0 K/mm3  05/27/19  00:58    


 


WBC Morphology  Not Reportable   05/27/19  00:58    


 


WBC Morphology  TNR   05/27/19  00:58    


 


Hypersegmented Neuts  Not Reportable   05/27/19  00:58    


 


Hyposegmented Neuts  Not Reportable   05/27/19  00:58    


 


Hypogranular Neuts  Not Reportable   05/27/19  00:58    


 


  Not Reportable   05/27/19  00:58    


 


  Not Reportable   05/27/19  00:58    


 


  Not Reportable   05/27/19  00:58    


 


  Not Reportable   05/27/19  00:58    


 


  Not Reportable   05/27/19  00:58    


 


  Not Reportable   05/27/19  00:58    


 


  Consistent w auto   05/27/19  00:58    


 


  Not Reportable   05/27/19  00:58    


 


Plt Clumps, EDTA  Not Reportable   05/27/19  00:58    


 


  Not Reportable   05/27/19  00:58    


 


  Not Reportable   05/27/19  00:58    


 


  Not Reportable   05/27/19  00:58    


 


Plt Morphology Comment  Not Reportable   05/27/19  00:58    


 


RBC Morphology  Not Reportable   05/27/19  00:58    


 


Dimorphic RBCs  Not Reportable   05/27/19  00:58    


 


  Not Reportable   05/27/19  00:58    


 


  Not Reportable   05/27/19  00:58    


 


  Not Reportable   05/27/19  00:58    


 


  1+   05/27/19  00:58    


 


  Not Reportable   05/27/19  00:58    


 


  Not Reportable   05/27/19  00:58    


 


  Not Reportable   05/27/19  00:58    


 


  Not Reportable   05/27/19  00:58    


 


  Not Reportable   05/27/19  00:58    


 


  Not Reportable   05/27/19  00:58    


 


  Not Reportable   05/27/19  00:58    


 


  Not Reportable   05/27/19  00:58    


 


  Not Reportable   05/27/19  00:58    


 


  Not Reportable   05/27/19  00:58    


 


  Not Reportable   05/27/19  00:58    


 


  Not Reportable   05/27/19  00:58    


 


  Not Reportable   05/27/19  00:58    


 


  Not Reportable   05/27/19  00:58    


 


  Not Reportable   05/27/19  00:58    


 


Acanthocytes (Spur)  Not Reportable   05/27/19  00:58    


 


Rouleaux  Not Reportable   05/27/19  00:58    


 


  Not Reportable   05/27/19  00:58    


 


  Not Reportable   05/27/19  00:58    


 


  Not Reportable   05/27/19  00:58    


 


  Not Reportable   05/27/19  00:58    


 


Hem Pathologist Commnt  No   05/27/19  00:58    


 


PT  14.5 Sec. (12.2-14.9)   05/27/19  00:58    


 


INR  1.06  (0.87-1.13)   05/27/19  00:58    


 


APTT  36.0 Sec. (24.2-36.6)   05/27/19  00:58    


 


  16.6 Sec. (15.1-19.6)   05/27/19  00:58    


 


Sodium  135 mmol/L (137-145)  L  05/28/19  05:41    


 


Potassium  3.9 mmol/L (3.6-5.0)   05/28/19  05:41    


 


Chloride  99.3 mmol/L ()   05/28/19  05:41    


 


Carbon Dioxide  25 mmol/L (22-30)   05/28/19  05:41    


 


  15 mmol/L  05/28/19  05:41    


 


BUN  11 mg/dL (9-20)   05/28/19  05:41    


 


  0.6 mg/dL (0.8-1.5)  L  05/28/19  05:41    


 


Estimated GFR  > 60 ml/min  05/28/19  05:41    


 


  18 %  05/28/19  05:41    


 


Glucose  96 mg/dL ()   05/28/19  05:41    


 


POC Glucose  99  ()   05/27/19  21:26    


 


  6.2 % (4-6)  H  05/28/19  05:41    


 


Lactic Acid  1.90 mmol/L (0.7-2.0)   05/27/19  04:35    


 


Calcium  8.7 mg/dL (8.4-10.2)   05/28/19  05:41    


 


  0.30 mg/dL (0.1-1.2)   05/28/19  05:41    


 


AST  14 units/L (5-40)   05/28/19  05:41    


 


ALT  9 units/L (7-56)   05/28/19  05:41    


 


  64 units/L ()   05/28/19  05:41    


 


  < 0.010 ng/mL (0.00-0.029)   05/27/19  00:58    


 


  7.1 g/dL (6.3-8.2)   05/28/19  05:41    


 


  3.2 g/dL (3.9-5)  L  05/28/19  05:41    


 


  0.8 %  05/28/19  05:41    


 


Triglycerides  65 mg/dL (2-149)   05/28/19  05:41    


 


Cholesterol  137 mg/dL ()   05/28/19  05:41    


 


  87 mg/dL ()   05/28/19  05:41    


 


  39 mg/dL (40-59)  L  05/28/19  05:41    


 


  3.51 %  05/28/19  05:41    














Active Medications





- Current Medications


Current Medications: 














Generic Name Dose Route Start Last Admin





  Trade Name Freq  PRN Reason Stop Dose Admin


 


Acetaminophen  650 mg  05/27/19 04:17 





  Tylenol  PO  





  Q4H PRN  





  Pain MILD(1-3)/Fever >100.5/HA  


 


Atorvastatin Calcium  40 mg  05/27/19 22:00  05/31/19 21:51





  Lipitor  PO   40 mg





  QHS JOSE LUIS   Administration


 


Dexamethasone  4 mg  05/29/19 12:00  06/01/19 05:55





  Decadron  IV   4 mg





  Q6HR JOSE LUIS   Administration


 


Docusate Sodium  100 mg  05/27/19 10:00  06/01/19 10:41





  Colace  PO   100 mg





  BID JOSE LUIS   Administration


 


Hydralazine HCl  10 mg  05/27/19 04:57 





  Apresoline  IV  





  Q4HR PRN  





  Blood Pressure  


 


Ceftriaxone Sodium  1 gm in 50 mls @ 100 mls/hr  05/27/19 06:16  06/01/19 10:41





  Rocephin/Ns 1 Gm/50 Ml  IV   100 mls/hr





  Q24HR JOSE LUIS   Administration





  Protocol  


 


Levetiracetam  1,000 mg  05/27/19 22:00  06/01/19 10:41





  Keppra  PO   1,000 mg





  BID JOSE LUIS   Administration


 


Ondansetron HCl  4 mg  05/27/19 04:17 





  Zofran  IV  





  Q8H PRN  





  Nausea And Vomiting  


 


Pyridoxine HCl  200 mg  05/27/19 17:00  06/01/19 10:40





  Vitamin B-6  PO   200 mg





  QDAY JOSE LUIS   Administration


 


Sodium Chloride  10 ml  05/27/19 10:00  06/01/19 10:42





  Sodium Chloride Flush Syringe 10 Ml  IV   10 ml





  BID JOSE LUIS   Administration


 


Sodium Chloride  10 ml  05/27/19 04:17 





  Sodium Chloride Flush Syringe 10 Ml  IV  





  PRN PRN  





  LINE FLUSH  














Nutrition/Malnutrition Assess





- Dietary Evaluation


Nutrition/Malnutrition Findings: 


                                        





Nutrition Notes                                            Start:  05/27/19 

15:53


Freq:                                                      Status: Active       




Protocol:                                                                       




 Document     05/31/19 14:56  RM  (Rec: 05/31/19 15:20  RM  [id number])


 Nutrition Notes


     Initial or Follow up                        Brief Note


     Current Diagnosis                           Hypertension


     Other Pertinent Diagnosis                   AMS, ?CVA, Frequent falls,


                                                 Suspected lung cancer, Hx TBI,


                                                 Hx EOTH abuse


     Current Diet                                Cardiac


     Labs/Tests                                  Reviewed


     Pertinent Medications                       Reviewed


     Height                                      5 ft 8 in


     Weight                                      75.5 kg


     Ideal Body Weight (kg)                      70.00


     BMI                                         25.2


     Subjective/Other Information                Pt stated that his appetite is


                                                 good and that he eats all of


                                                 his meals.


     Burn                                        Absent


     Trauma                                      Absent


 Nutrition Intervention


     Revisit per MD consult or patient           Sign Off


      request:

## 2019-06-01 NOTE — CONSULTATION
REFERRING DOCTOR:  Monica Valentine MD



CHIEF COMPLAINT:  Unable to get chief complaint.



PROFILE:  This is a 73-year-old gentleman with probable metastatic lung cancer

with multiple brain metastases, referred for whole brain radiation therapy.



CONCLUSION:

1.  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

multiple brain metastases diagnosed on 05/30/2019.

2.  History of traumatic brain injury.

3.  History of intracranial bleed in 2008.

4.  History of tobacco and alcohol use.



We will proceed forward with a biopsy of the left lung lesion.  Once malignancy

is proven, we will plan to deliver dose of 30 Gy in 10 fractions.  He is

agreeable to radiation.



IMPRESSION:  This is a very unfortunate 73-year-old -American gentleman,

who has a history of intracranial hemorrhage in 2008 secondary to injury, also

alcohol and tobacco abuse.  He presented to [facility]

with right-sided weakness and altered mentation.  He was found on CT scan to

have multiple brain metastases.  An MRI of the brain revealed at least 5 left

cerebral masses involving the frontal, parietal and occipital lobe.  There was a

small amount of subacute or chronic hemorrhage, mass effect was present.  No

midline shift was noted.  A CT scan of the chest confirmed a 3.2 cm mass in the

left upper lung that was suspicious for malignancy.  He is now referred for

whole brain radiation therapy.  He has been placed on Keppra and steroids. 

Clinically, the patient denies any headache, visual changes.  He notes weakness

in his right upper extremity.  He has moderate expressive aphasia.



PAST MEDICAL HISTORY:  As above, traumatic brain injury.



SOCIAL HISTORY:  History of tobacco and alcohol use.



MEDICATIONS:  Atorvastatin, dexamethasone, Keppra, hydralazine.



ALLERGIES:  None known.



PHYSICAL EXAMINATION:

GENERAL:  He is a chronically ill-appearing gentleman, in no acute distress.

VITAL SIGNS:  Blood pressure 119/65, respirations 18, pulse 72, afebrile.  ECOG

equals 2.  Pain equals 0/10.

HEENT:  Normocephalic, atraumatic.  Eyes are clear.

NECK:  Thin.  No nodes.

LUNGS:  Decreased breath sounds bilaterally.

CARDIOVASCULAR:  Regular rate and rhythm.  No murmur, gallop or bruit.

ABDOMEN:  Soft, nontender.

EXTREMITIES:  No clubbing, cyanosis or edema.

NEUROLOGIC:  He is alert and oriented.  He has moderate expressive aphasia.  He

has significant weakness in the right upper and lower extremities.  Sensory exam

intact.



DISCUSSION:  This is an unfortunate 73-year-old -American gentleman, who

has probable metastatic carcinoma of the left upper lung with multiple brain

lesions consistent with metastasis.  Ideally, we need to obtain tissue to obtain

a biopsy of the left upper lung mass.  After malignancy is confirmed, we will

plan to deliver whole brain radiation therapy to a dose of 30 Gy in 10

fractions.  He is amenable to radiation.  Risk of radiation was discussed

including fatigue, hair loss, atrophy.





DD: 05/31/2019 16:26

DT: 06/01/2019 05:28

JOB# [id number]  [id number]

CAYETANO/KEITH CASTILLO

## 2019-06-02 RX ADMIN — LEVETIRACETAM SCH MG: 500 TABLET, FILM COATED ORAL at 21:29

## 2019-06-02 RX ADMIN — DEXAMETHASONE SODIUM PHOSPHATE SCH MG: 4 INJECTION, SOLUTION INTRAMUSCULAR; INTRAVENOUS at 17:44

## 2019-06-02 RX ADMIN — DOCUSATE SODIUM SCH MG: 100 CAPSULE, LIQUID FILLED ORAL at 21:30

## 2019-06-02 RX ADMIN — Medication SCH ML: at 09:59

## 2019-06-02 RX ADMIN — DEXAMETHASONE SODIUM PHOSPHATE SCH MG: 4 INJECTION, SOLUTION INTRAMUSCULAR; INTRAVENOUS at 13:00

## 2019-06-02 RX ADMIN — LEVETIRACETAM SCH MG: 500 TABLET, FILM COATED ORAL at 09:59

## 2019-06-02 RX ADMIN — Medication SCH MG: at 09:59

## 2019-06-02 RX ADMIN — DEXAMETHASONE SODIUM PHOSPHATE SCH MG: 4 INJECTION, SOLUTION INTRAMUSCULAR; INTRAVENOUS at 06:02

## 2019-06-02 RX ADMIN — Medication SCH ML: at 21:30

## 2019-06-02 RX ADMIN — DOCUSATE SODIUM SCH MG: 100 CAPSULE, LIQUID FILLED ORAL at 09:51

## 2019-06-02 NOTE — PROGRESS NOTE
Assessment and Plan


Assessment and plan: 





Multiple left cerebral masses consistent with metastatic lesions:  Patient with 

lesions involving frontal, parietal and occipital lobes.  No evidence of 

infarction.  Continue Decadron.  Oncology and pulmonary following.  Repeat MRI 

revealed 7 mass lesions 





Lung mass suspected Lung cancer with Mets: FOB rescheduled for Monday per 

pulmonary





Hypertension: low salt diet





Leukocytosis: empiric iv rocephin, etiology likely secondary to Decadron





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





Hx of EtOH abuse





Hx of Tobacco dependency:  on stopping





DVT prophylaxis. SCDs.  Lovenox held due to hemorrhage with lesions.





Disposition.  Physical therapy recommends acute rehabilitation.





History


Interval history: 


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 

dependency and alcohol abuse who presents to [facility] ED with c/o altered mental 

status and new right-sided weakness. Old deficits was loss of hearing and 

abnormal speech but no motor deficits per niece Valentina.  CT head without 

contrast revealed Large area of hypoattenuation identified in the upper left 

frontal and temporal lobes, this surrounds a few areas of rounded increased 

attenuation in the high convexity of the posterior left frontal and posterior 

left temporal lobes. The findings may indicate sequelae from subacute to chronic

infarction. The differential also included primary metastatic tumor in this reg

ion of the left cerebral hemisphere. Further evaluation with advanced cross-

sectional imaging utilizing MRI revealed multiple left cerebral masses which are

consistent with metastatic lesions involving the frontoparietal and occipital 

lobes.  There is a small amount of subcutaneous hemorrhage associated with most 

all of the lesions.  Mass effect in the left cerebral hemisphere but no midline 

shift.  No evidence of acute/subacute infarct.  The patient was started on 

Decadron and oncology consuledt.  Pulmonary also saw the patient in consultation

with regards to likely primary lesion with pulmonary nodule.


No new issues overnight





Hospitalist Physical





- Constitutional


Vitals: 


                                        











Temp Pulse Resp BP Pulse Ox


 


 98.3 F   53 L  16   118/61   97 


 


 06/02/19 08:03  06/02/19 08:03  06/02/19 08:03  06/02/19 08:03  06/02/19 08:03











General appearance: Present: no acute distress





- EENT


Eyes: Present: PERRL, EOM intact


ENT: hearing intact, clear oral mucosa, dentition normal





- Neck


Neck: Present: supple, normal ROM





- Respiratory


Respiratory effort: normal


Respiratory: bilateral: CTA





- Cardiovascular


Rhythm: regular


Heart Sounds: Present: S1 & S2.  Absent: gallop, rub





- Extremities


Extremities: no ischemia, No edema, Full ROM





- Abdominal


General gastrointestinal: soft, non-tender, non-distended, normal bowel sounds





- Integumentary


Integumentary: Present: clear, warm, dry





- Neurologic


Neurologic: CNII-XII intact, moves all extremities





Results





- Labs


CBC & Chem 7: 


                                 05/28/19 05:41





                                 05/28/19 05:41


Labs: 


                             Laboratory Last Values











WBC  16.8 K/mm3 (4.5-11.0)  H  05/28/19  05:41    


 


RBC  4.87 M/mm3 (3.65-5.03)   05/28/19  05:41    


 


Hgb  12.9 gm/dl (11.8-15.2)   05/28/19  05:41    


 


Hct  38.7 % (35.5-45.6)   05/28/19  05:41    


 


MCV  80 fl (84-94)  L  05/28/19  05:41    


 


MCH  27 pg (28-32)  L  05/28/19  05:41    


 


MCHC  33 % (32-34)   05/28/19  05:41    


 


RDW  13.8 % (13.2-15.2)   05/28/19  05:41    


 


Plt Count  443 K/mm3 (140-440)  H  05/28/19  05:41    


 


Lymph % (Auto)  17.7 % (13.4-35.0)   05/28/19  05:41    


 


Mono % (Auto)  7.9 % (0.0-7.3)  H  05/28/19  05:41    


 


Eos % (Auto)  2.6 % (0.0-4.3)   05/28/19  05:41    


 


Baso % (Auto)  0.7 % (0.0-1.8)   05/28/19  05:41    


 


Lymph #  3.0 K/mm3 (1.2-5.4)   05/28/19  05:41    


 


Mono #  1.3 K/mm3 (0.0-0.8)  H  05/28/19  05:41    


 


Eos #  0.4 K/mm3 (0.0-0.4)   05/28/19  05:41    


 


Baso #  0.1 K/mm3 (0.0-0.1)   05/28/19  05:41    


 


Add Manual Diff  Complete   05/27/19  00:58    


 


Total Counted  100   05/27/19  00:58    


 


Seg Neutrophils %  71.1 % (40.0-70.0)  H  05/28/19  05:41    


 


Seg Neuts % (Manual)  66.0 % (40.0-70.0)   05/27/19  00:58    


 


  0 %  05/27/19  00:58    


 


  25.0 % (13.4-35.0)   05/27/19  00:58    


 


Reactive Lymphs % (Man)  0 %  05/27/19  00:58    


 


  6.0 % (0.0-7.3)   05/27/19  00:58    


 


  3.0 % (0.0-4.3)   05/27/19  00:58    


 


  0 % (0.0-1.8)   05/27/19  00:58    


 


  0 %  05/27/19  00:58    


 


  0 %  05/27/19  00:58    


 


  0 %  05/27/19  00:58    


 


  0 %  05/27/19  00:58    


 


Nucleated RBC %  Not Reportable   05/27/19  00:58    


 


Seg Neutrophils #  12.0 K/mm3 (1.8-7.7)  H  05/28/19  05:41    


 


Seg Neutrophils # Man  11.7 K/mm3 (1.8-7.7)  H  05/27/19  00:58    


 


Band Neutrophils #  0.0 K/mm3  05/27/19  00:58    


 


  4.4 K/mm3 (1.2-5.4)   05/27/19  00:58    


 


Abs React Lymphs (Man)  0.0 K/mm3  05/27/19  00:58    


 


  1.1 K/mm3 (0.0-0.8)  H  05/27/19  00:58    


 


  0.5 K/mm3 (0.0-0.4)  H  05/27/19  00:58    


 


  0.0 K/mm3 (0.0-0.1)   05/27/19  00:58    


 


  0.0 K/mm3  05/27/19  00:58    


 


  0.0 K/mm3  05/27/19  00:58    


 


  0.0 K/mm3  05/27/19  00:58    


 


Blast Cells #  0.0 K/mm3  05/27/19  00:58    


 


WBC Morphology  Not Reportable   05/27/19  00:58    


 


WBC Morphology  TNR   05/27/19  00:58    


 


Hypersegmented Neuts  Not Reportable   05/27/19  00:58    


 


Hyposegmented Neuts  Not Reportable   05/27/19  00:58    


 


Hypogranular Neuts  Not Reportable   05/27/19  00:58    


 


  Not Reportable   05/27/19  00:58    


 


  Not Reportable   05/27/19  00:58    


 


  Not Reportable   05/27/19  00:58    


 


  Not Reportable   05/27/19  00:58    


 


  Not Reportable   05/27/19  00:58    


 


  Not Reportable   05/27/19  00:58    


 


  Consistent w auto   05/27/19  00:58    


 


  Not Reportable   05/27/19  00:58    


 


Plt Clumps, EDTA  Not Reportable   05/27/19  00:58    


 


  Not Reportable   05/27/19  00:58    


 


  Not Reportable   05/27/19  00:58    


 


  Not Reportable   05/27/19  00:58    


 


Plt Morphology Comment  Not Reportable   05/27/19  00:58    


 


RBC Morphology  Not Reportable   05/27/19  00:58    


 


Dimorphic RBCs  Not Reportable   05/27/19  00:58    


 


  Not Reportable   05/27/19  00:58    


 


  Not Reportable   05/27/19  00:58    


 


  Not Reportable   05/27/19  00:58    


 


  1+   05/27/19  00:58    


 


  Not Reportable   05/27/19  00:58    


 


  Not Reportable   05/27/19  00:58    


 


  Not Reportable   05/27/19  00:58    


 


  Not Reportable   05/27/19  00:58    


 


  Not Reportable   05/27/19  00:58    


 


  Not Reportable   05/27/19  00:58    


 


  Not Reportable   05/27/19  00:58    


 


  Not Reportable   05/27/19  00:58    


 


  Not Reportable   05/27/19  00:58    


 


  Not Reportable   05/27/19  00:58    


 


  Not Reportable   05/27/19  00:58    


 


  Not Reportable   05/27/19  00:58    


 


  Not Reportable   05/27/19  00:58    


 


  Not Reportable   05/27/19  00:58    


 


  Not Reportable   05/27/19  00:58    


 


Acanthocytes (Spur)  Not Reportable   05/27/19  00:58    


 


Rouleaux  Not Reportable   05/27/19  00:58    


 


  Not Reportable   05/27/19  00:58    


 


  Not Reportable   05/27/19  00:58    


 


  Not Reportable   05/27/19  00:58    


 


  Not Reportable   05/27/19  00:58    


 


Hem Pathologist Commnt  No   05/27/19  00:58    


 


PT  14.5 Sec. (12.2-14.9)   05/27/19  00:58    


 


INR  1.06  (0.87-1.13)   05/27/19  00:58    


 


APTT  36.0 Sec. (24.2-36.6)   05/27/19  00:58    


 


  16.6 Sec. (15.1-19.6)   05/27/19  00:58    


 


Sodium  135 mmol/L (137-145)  L  05/28/19  05:41    


 


Potassium  3.9 mmol/L (3.6-5.0)   05/28/19  05:41    


 


Chloride  99.3 mmol/L ()   05/28/19  05:41    


 


Carbon Dioxide  25 mmol/L (22-30)   05/28/19  05:41    


 


  15 mmol/L  05/28/19  05:41    


 


BUN  11 mg/dL (9-20)   05/28/19  05:41    


 


  0.6 mg/dL (0.8-1.5)  L  05/28/19  05:41    


 


Estimated GFR  > 60 ml/min  05/28/19  05:41    


 


  18 %  05/28/19  05:41    


 


Glucose  96 mg/dL ()   05/28/19  05:41    


 


POC Glucose  99  ()   05/27/19  21:26    


 


  6.2 % (4-6)  H  05/28/19  05:41    


 


Lactic Acid  1.90 mmol/L (0.7-2.0)   05/27/19  04:35    


 


Calcium  8.7 mg/dL (8.4-10.2)   05/28/19  05:41    


 


  0.30 mg/dL (0.1-1.2)   05/28/19  05:41    


 


AST  14 units/L (5-40)   05/28/19  05:41    


 


ALT  9 units/L (7-56)   05/28/19  05:41    


 


  64 units/L ()   05/28/19  05:41    


 


  < 0.010 ng/mL (0.00-0.029)   05/27/19  00:58    


 


  7.1 g/dL (6.3-8.2)   05/28/19  05:41    


 


  3.2 g/dL (3.9-5)  L  05/28/19  05:41    


 


  0.8 %  05/28/19  05:41    


 


Triglycerides  65 mg/dL (2-149)   05/28/19  05:41    


 


Cholesterol  137 mg/dL ()   05/28/19  05:41    


 


  87 mg/dL ()   05/28/19  05:41    


 


  39 mg/dL (40-59)  L  05/28/19  05:41    


 


  3.51 %  05/28/19  05:41    














Active Medications





- Current Medications


Current Medications: 














Generic Name Dose Route Start Last Admin





  Trade Name Freq  PRN Reason Stop Dose Admin


 


Acetaminophen  650 mg  05/27/19 04:17 





  Tylenol  PO  





  Q4H PRN  





  Pain MILD(1-3)/Fever >100.5/HA  


 


Atorvastatin Calcium  40 mg  05/27/19 22:00  06/01/19 21:35





  Lipitor  PO   40 mg





  QHS JOSE LUIS   Administration


 


Dexamethasone  4 mg  05/29/19 12:00  06/02/19 06:02





  Decadron  IV   4 mg





  Q6HR JOSE LUIS   Administration


 


Docusate Sodium  100 mg  05/27/19 10:00  06/02/19 09:51





  Colace  PO   100 mg





  BID JOSE LUIS   Administration


 


Hydralazine HCl  10 mg  05/27/19 04:57 





  Apresoline  IV  





  Q4HR PRN  





  Blood Pressure  


 


Levetiracetam  1,000 mg  05/27/19 22:00  06/02/19 09:59





  Keppra  PO   1,000 mg





  BID JOSE LUIS   Administration


 


Ondansetron HCl  4 mg  05/27/19 04:17 





  Zofran  IV  





  Q8H PRN  





  Nausea And Vomiting  


 


Pyridoxine HCl  200 mg  05/27/19 17:00  06/02/19 09:59





  Vitamin B-6  PO   200 mg





  QDAY JOSE LUIS   Administration


 


Sodium Chloride  10 ml  05/27/19 10:00  06/02/19 09:59





  Sodium Chloride Flush Syringe 10 Ml  IV   10 ml





  BID JOSE LUIS   Administration


 


Sodium Chloride  10 ml  05/27/19 04:17 





  Sodium Chloride Flush Syringe 10 Ml  IV  





  PRN PRN  





  LINE FLUSH  














Nutrition/Malnutrition Assess





- Dietary Evaluation


Nutrition/Malnutrition Findings: 


                                        





Nutrition Notes                                            Start:  05/27/19 

15:53


Freq:                                                      Status: Active       




Protocol:                                                                       




 Document     05/31/19 14:56  RM  (Rec: 05/31/19 15:20  RM  [id number])


 Nutrition Notes


     Initial or Follow up                        Brief Note


     Current Diagnosis                           Hypertension


     Other Pertinent Diagnosis                   AMS, ?CVA, Frequent falls,


                                                 Suspected lung cancer, Hx TBI,


                                                 Hx EOTH abuse


     Current Diet                                Cardiac


     Labs/Tests                                  Reviewed


     Pertinent Medications                       Reviewed


     Height                                      5 ft 8 in


     Weight                                      75.5 kg


     Ideal Body Weight (kg)                      70.00


     BMI                                         25.2


     Subjective/Other Information                Pt stated that his appetite is


                                                 good and that he eats all of


                                                 his meals.


     Burn                                        Absent


     Trauma                                      Absent


 Nutrition Intervention


     Revisit per MD consult or patient           Sign Off


      request:

## 2019-06-03 RX ADMIN — Medication SCH ML: at 21:32

## 2019-06-03 RX ADMIN — Medication SCH ML: at 11:57

## 2019-06-03 RX ADMIN — DEXAMETHASONE SODIUM PHOSPHATE SCH MG: 4 INJECTION, SOLUTION INTRAMUSCULAR; INTRAVENOUS at 17:48

## 2019-06-03 RX ADMIN — LEVETIRACETAM SCH MG: 500 TABLET, FILM COATED ORAL at 21:32

## 2019-06-03 RX ADMIN — DOCUSATE SODIUM SCH MG: 100 CAPSULE, LIQUID FILLED ORAL at 21:32

## 2019-06-03 RX ADMIN — DEXAMETHASONE SODIUM PHOSPHATE SCH MG: 4 INJECTION, SOLUTION INTRAMUSCULAR; INTRAVENOUS at 11:57

## 2019-06-03 RX ADMIN — Medication SCH MG: at 14:13

## 2019-06-03 RX ADMIN — LEVETIRACETAM SCH MG: 500 TABLET, FILM COATED ORAL at 14:13

## 2019-06-03 RX ADMIN — DEXAMETHASONE SODIUM PHOSPHATE SCH MG: 4 INJECTION, SOLUTION INTRAMUSCULAR; INTRAVENOUS at 00:15

## 2019-06-03 RX ADMIN — DOCUSATE SODIUM SCH MG: 100 CAPSULE, LIQUID FILLED ORAL at 14:13

## 2019-06-03 RX ADMIN — DEXAMETHASONE SODIUM PHOSPHATE SCH MG: 4 INJECTION, SOLUTION INTRAMUSCULAR; INTRAVENOUS at 05:42

## 2019-06-03 NOTE — PROGRESS NOTE
Assessment and Plan





72 y/o male with lung mass





1.  Needs CT guided biopsy if possible





2.  All others per primary





Subjective


Date of service: 06/03/19


Principal diagnosis: lung lesion and brain lesions


Interval history: 





No acute events.  Reviewed CT scan this am.





Objective


                               Vital Signs - 12hr











  06/03/19 06/03/19 06/03/19





  03:36 05:00 09:06


 


Temperature 97.4 F L  98.4 F


 


Pulse Rate 52 L 56 L 


 


Respiratory 18  18





Rate   


 


Blood Pressure 107/59  110/54


 


O2 Sat by Pulse 100  





Oximetry   











Constitutional: no acute distress, alert


ENT: oropharynx moist


Neck: supple


Effort: normal


Ascultation: Bilateral: clear, diminished breath sounds


Percussion: Bilateral: not dull


Tactile fremitus: Bilateral: normal


Cardiovascular: regular rate and rhythm


Integumentary: normal


Extremities: no cyanosis, no cyanosis, no cyanosis


Neurologic: other (right hemiparesis)


CBC and BMP: 


                                 06/04/19 05:13





                                 06/04/19 05:13


ABG, PT/INR, D-dimer: 


PT/INR, D-dimer











PT  14.5 Sec. (12.2-14.9)   05/27/19  00:58    


 


INR  1.06  (0.87-1.13)   05/27/19  00:58    








Abnormal lab findings: 


                                  Abnormal Labs











  05/27/19 05/28/19 05/28/19





  00:58 05:41 05:41


 


WBC  17.7 H  16.8 H 


 


MCV  81 L  80 L 


 


MCH  27 L  27 L 


 


Plt Count   443 H 


 


Mono % (Auto)   7.9 H 


 


Tucker #   1.3 H 


 


Seg Neutrophils %   71.1 H 


 


Seg Neutrophils #   12.0 H 


 


Seg Neutrophils # Man  11.7 H  


 


Monocytes # (Manual)  1.1 H  


 


Eosinophils # (Manual)  0.5 H  


 


Sodium    135 L


 


Creatinine    0.6 L


 


Hemoglobin A1c   


 


Albumin    3.2 L


 


HDL Cholesterol    39 L














  05/28/19





  05:41


 


WBC 


 


MCV 


 


MCH 


 


Plt Count 


 


Mono % (Auto) 


 


Mono # 


 


Seg Neutrophils % 


 


Seg Neutrophils # 


 


Seg Neutrophils # Man 


 


Monocytes # (Manual) 


 


Eosinophils # (Manual) 


 


Sodium 


 


Creatinine 


 


Hemoglobin A1c  6.2 H


 


Albumin 


 


HDL Cholesterol

## 2019-06-03 NOTE — PROGRESS NOTE
Assessment and Plan


Assessment and plan: 





Multiple left cerebral masses consistent with metastatic lesions:  Patient with 

lesions involving frontal, parietal and occipital lobes.  No evidence of 

infarction.  Continue Decadron.  Oncology and pulmonary following.  Repeat MRI 

revealed 7 mass lesions 





Lung mass suspected Lung cancer with Mets: FOB rescheduled for today per 

pulmonary





Hypertension: low salt diet





Leukocytosis: empiric iv rocephin, etiology likely secondary to Decadron





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





Hx of EtOH abuse





Hx of Tobacco dependency:  on stopping





DVT prophylaxis. SCDs.  Lovenox held due to hemorrhage with lesions.





Disposition.  Physical therapy recommends acute rehabilitation.





History


Interval history: 


Patient is a 72 yo man with a history of ICH in 2008 secondary to TBI, tobacco 

dependency and alcohol abuse who presents to [facility] ED with c/o altered mental 

status and new right-sided weakness. Old deficits was loss of hearing and 

abnormal speech but no motor deficits per niece Valentina.  CT head without 

contrast revealed Large area of hypoattenuation identified in the upper left 

frontal and temporal lobes, this surrounds a few areas of rounded increased 

attenuation in the high convexity of the posterior left frontal and posterior 

left temporal lobes. The findings may indicate sequelae from subacute to chronic

infarction. The differential also included primary metastatic tumor in this dominic

on of the left cerebral hemisphere. Further evaluation with advanced cross-

sectional imaging utilizing MRI revealed multiple left cerebral masses which are

consistent with metastatic lesions involving the frontoparietal and occipital 

lobes.  There is a small amount of subcutaneous hemorrhage associated with most 

all of the lesions.  Mass effect in the left cerebral hemisphere but no midline 

shift.  No evidence of acute/subacute infarct.  The patient was started on 

Decadron and oncology consuledt.  Pulmonary also saw the patient in consultation

with regards to likely primary lesion with pulmonary nodule.


No new issues overnight





Hospitalist Physical





- Constitutional


Vitals: 


                                        











Temp Pulse Resp BP Pulse Ox


 


 98.4 F   56 L  18   110/54   100 


 


 06/03/19 09:06  06/03/19 05:00  06/03/19 09:06  06/03/19 09:06  06/03/19 03:36











General appearance: Present: no acute distress





- EENT


Eyes: Present: PERRL, EOM intact


ENT: hearing intact, clear oral mucosa, dentition normal





- Neck


Neck: Present: supple, normal ROM





- Respiratory


Respiratory effort: normal


Respiratory: bilateral: CTA





- Cardiovascular


Rhythm: regular


Heart Sounds: Present: S1 & S2.  Absent: gallop, rub





- Extremities


Extremities: no ischemia, No edema, Full ROM





- Abdominal


General gastrointestinal: soft, non-tender, non-distended, normal bowel sounds





- Integumentary


Integumentary: Present: clear, warm, dry





- Neurologic


Neurologic: CNII-XII intact, moves all extremities





Results





- Labs


CBC & Chem 7: 


                                 05/28/19 05:41





                                 05/28/19 05:41


Labs: 


                             Laboratory Last Values











WBC  16.8 K/mm3 (4.5-11.0)  H  05/28/19  05:41    


 


RBC  4.87 M/mm3 (3.65-5.03)   05/28/19  05:41    


 


Hgb  12.9 gm/dl (11.8-15.2)   05/28/19  05:41    


 


Hct  38.7 % (35.5-45.6)   05/28/19  05:41    


 


MCV  80 fl (84-94)  L  05/28/19  05:41    


 


MCH  27 pg (28-32)  L  05/28/19  05:41    


 


MCHC  33 % (32-34)   05/28/19  05:41    


 


RDW  13.8 % (13.2-15.2)   05/28/19  05:41    


 


Plt Count  443 K/mm3 (140-440)  H  05/28/19  05:41    


 


Lymph % (Auto)  17.7 % (13.4-35.0)   05/28/19  05:41    


 


Mono % (Auto)  7.9 % (0.0-7.3)  H  05/28/19  05:41    


 


Eos % (Auto)  2.6 % (0.0-4.3)   05/28/19  05:41    


 


Baso % (Auto)  0.7 % (0.0-1.8)   05/28/19  05:41    


 


Lymph #  3.0 K/mm3 (1.2-5.4)   05/28/19  05:41    


 


Mono #  1.3 K/mm3 (0.0-0.8)  H  05/28/19  05:41    


 


Eos #  0.4 K/mm3 (0.0-0.4)   05/28/19  05:41    


 


Baso #  0.1 K/mm3 (0.0-0.1)   05/28/19  05:41    


 


Add Manual Diff  Complete   05/27/19  00:58    


 


Total Counted  100   05/27/19  00:58    


 


Seg Neutrophils %  71.1 % (40.0-70.0)  H  05/28/19  05:41    


 


Seg Neuts % (Manual)  66.0 % (40.0-70.0)   05/27/19  00:58    


 


  0 %  05/27/19  00:58    


 


  25.0 % (13.4-35.0)   05/27/19  00:58    


 


Reactive Lymphs % (Man)  0 %  05/27/19  00:58    


 


  6.0 % (0.0-7.3)   05/27/19  00:58    


 


  3.0 % (0.0-4.3)   05/27/19  00:58    


 


  0 % (0.0-1.8)   05/27/19  00:58    


 


  0 %  05/27/19  00:58    


 


  0 %  05/27/19  00:58    


 


  0 %  05/27/19  00:58    


 


  0 %  05/27/19  00:58    


 


Nucleated RBC %  Not Reportable   05/27/19  00:58    


 


Seg Neutrophils #  12.0 K/mm3 (1.8-7.7)  H  05/28/19  05:41    


 


Seg Neutrophils # Man  11.7 K/mm3 (1.8-7.7)  H  05/27/19  00:58    


 


Band Neutrophils #  0.0 K/mm3  05/27/19  00:58    


 


  4.4 K/mm3 (1.2-5.4)   05/27/19  00:58    


 


Abs React Lymphs (Man)  0.0 K/mm3  05/27/19  00:58    


 


  1.1 K/mm3 (0.0-0.8)  H  05/27/19  00:58    


 


  0.5 K/mm3 (0.0-0.4)  H  05/27/19  00:58    


 


  0.0 K/mm3 (0.0-0.1)   05/27/19  00:58    


 


  0.0 K/mm3  05/27/19  00:58    


 


  0.0 K/mm3  05/27/19  00:58    


 


  0.0 K/mm3  05/27/19  00:58    


 


Blast Cells #  0.0 K/mm3  05/27/19  00:58    


 


WBC Morphology  Not Reportable   05/27/19  00:58    


 


WBC Morphology  TNR   05/27/19  00:58    


 


Hypersegmented Neuts  Not Reportable   05/27/19  00:58    


 


Hyposegmented Neuts  Not Reportable   05/27/19  00:58    


 


Hypogranular Neuts  Not Reportable   05/27/19  00:58    


 


  Not Reportable   05/27/19  00:58    


 


  Not Reportable   05/27/19  00:58    


 


  Not Reportable   05/27/19  00:58    


 


  Not Reportable   05/27/19  00:58    


 


  Not Reportable   05/27/19  00:58    


 


  Not Reportable   05/27/19  00:58    


 


  Consistent w auto   05/27/19  00:58    


 


  Not Reportable   05/27/19  00:58    


 


Plt Clumps, EDTA  Not Reportable   05/27/19  00:58    


 


  Not Reportable   05/27/19  00:58    


 


  Not Reportable   05/27/19  00:58    


 


  Not Reportable   05/27/19  00:58    


 


Plt Morphology Comment  Not Reportable   05/27/19  00:58    


 


RBC Morphology  Not Reportable   05/27/19  00:58    


 


Dimorphic RBCs  Not Reportable   05/27/19  00:58    


 


  Not Reportable   05/27/19  00:58    


 


  Not Reportable   05/27/19  00:58    


 


  Not Reportable   05/27/19  00:58    


 


  1+   05/27/19  00:58    


 


  Not Reportable   05/27/19  00:58    


 


  Not Reportable   05/27/19  00:58    


 


  Not Reportable   05/27/19  00:58    


 


  Not Reportable   05/27/19  00:58    


 


  Not Reportable   05/27/19  00:58    


 


  Not Reportable   05/27/19  00:58    


 


  Not Reportable   05/27/19  00:58    


 


  Not Reportable   05/27/19  00:58    


 


  Not Reportable   05/27/19  00:58    


 


  Not Reportable   05/27/19  00:58    


 


  Not Reportable   05/27/19  00:58    


 


  Not Reportable   05/27/19  00:58    


 


  Not Reportable   05/27/19  00:58    


 


  Not Reportable   05/27/19  00:58    


 


  Not Reportable   05/27/19  00:58    


 


Acanthocytes (Spur)  Not Reportable   05/27/19  00:58    


 


Rouleaux  Not Reportable   05/27/19  00:58    


 


  Not Reportable   05/27/19  00:58    


 


  Not Reportable   05/27/19  00:58    


 


  Not Reportable   05/27/19  00:58    


 


  Not Reportable   05/27/19  00:58    


 


Hem Pathologist Commnt  No   05/27/19  00:58    


 


PT  14.5 Sec. (12.2-14.9)   05/27/19  00:58    


 


INR  1.06  (0.87-1.13)   05/27/19  00:58    


 


APTT  36.0 Sec. (24.2-36.6)   05/27/19  00:58    


 


  16.6 Sec. (15.1-19.6)   05/27/19  00:58    


 


Sodium  135 mmol/L (137-145)  L  05/28/19  05:41    


 


Potassium  3.9 mmol/L (3.6-5.0)   05/28/19  05:41    


 


Chloride  99.3 mmol/L ()   05/28/19  05:41    


 


Carbon Dioxide  25 mmol/L (22-30)   05/28/19  05:41    


 


  15 mmol/L  05/28/19  05:41    


 


BUN  11 mg/dL (9-20)   05/28/19  05:41    


 


  0.6 mg/dL (0.8-1.5)  L  05/28/19  05:41    


 


Estimated GFR  > 60 ml/min  05/28/19  05:41    


 


  18 %  05/28/19  05:41    


 


Glucose  96 mg/dL ()   05/28/19  05:41    


 


POC Glucose  99  ()   05/27/19  21:26    


 


  6.2 % (4-6)  H  05/28/19  05:41    


 


Lactic Acid  1.90 mmol/L (0.7-2.0)   05/27/19  04:35    


 


Calcium  8.7 mg/dL (8.4-10.2)   05/28/19  05:41    


 


  0.30 mg/dL (0.1-1.2)   05/28/19  05:41    


 


AST  14 units/L (5-40)   05/28/19  05:41    


 


ALT  9 units/L (7-56)   05/28/19  05:41    


 


  64 units/L ()   05/28/19  05:41    


 


  < 0.010 ng/mL (0.00-0.029)   05/27/19  00:58    


 


  7.1 g/dL (6.3-8.2)   05/28/19  05:41    


 


  3.2 g/dL (3.9-5)  L  05/28/19  05:41    


 


  0.8 %  05/28/19  05:41    


 


Triglycerides  65 mg/dL (2-149)   05/28/19  05:41    


 


Cholesterol  137 mg/dL ()   05/28/19  05:41    


 


  87 mg/dL ()   05/28/19  05:41    


 


  39 mg/dL (40-59)  L  05/28/19  05:41    


 


  3.51 %  05/28/19  05:41    


 


Carcinoembryonic Ag  1.1 ng/mL (0.0-2.4)   05/30/19  04:39    


 


  4 U/mL (<34)   05/30/19  04:39    














Active Medications





- Current Medications


Current Medications: 














Generic Name Dose Route Start Last Admin





  Trade Name Freq  PRN Reason Stop Dose Admin


 


Acetaminophen  650 mg  05/27/19 04:17 





  Tylenol  PO  





  Q4H PRN  





  Pain MILD(1-3)/Fever >100.5/HA  


 


Atorvastatin Calcium  40 mg  05/27/19 22:00  06/02/19 21:29





  Lipitor  PO   40 mg





  QHS JOSE LUIS   Administration


 


Dexamethasone  4 mg  05/29/19 12:00  06/03/19 05:42





  Decadron  IV   4 mg





  Q6HR JOSE LUIS   Administration


 


Docusate Sodium  100 mg  05/27/19 10:00  06/02/19 21:30





  Colace  PO   100 mg





  BID JOSE LUIS   Administration


 


Hydralazine HCl  10 mg  05/27/19 04:57 





  Apresoline  IV  





  Q4HR PRN  





  Blood Pressure  


 


Levetiracetam  1,000 mg  05/27/19 22:00  06/02/19 21:29





  Keppra  PO   1,000 mg





  BID JOSE LUIS   Administration


 


Ondansetron HCl  4 mg  05/27/19 04:17 





  Zofran  IV  





  Q8H PRN  





  Nausea And Vomiting  


 


Pyridoxine HCl  200 mg  05/27/19 17:00  06/02/19 09:59





  Vitamin B-6  PO   200 mg





  QDAY JOSE LUIS   Administration


 


Sodium Chloride  10 ml  05/27/19 10:00  06/02/19 21:30





  Sodium Chloride Flush Syringe 10 Ml  IV   10 ml





  BID JOSE LUIS   Administration


 


Sodium Chloride  10 ml  05/27/19 04:17 





  Sodium Chloride Flush Syringe 10 Ml  IV  





  PRN PRN  





  LINE FLUSH  














Nutrition/Malnutrition Assess





- Dietary Evaluation


Nutrition/Malnutrition Findings: 


                                        





Nutrition Notes                                            Start:  05/27/19 

15:53


Freq:                                                      Status: Active       




Protocol:                                                                       




 Document     05/31/19 14:56  RM  (Rec: 05/31/19 15:20  RM  [id number])


 Nutrition Notes


     Initial or Follow up                        Brief Note


     Current Diagnosis                           Hypertension


     Other Pertinent Diagnosis                   AMS, ?CVA, Frequent falls,


                                                 Suspected lung cancer, Hx TBI,


                                                 Hx EOTH abuse


     Current Diet                                Cardiac


     Labs/Tests                                  Reviewed


     Pertinent Medications                       Reviewed


     Height                                      5 ft 8 in


     Weight                                      75.5 kg


     Ideal Body Weight (kg)                      70.00


     BMI                                         25.2


     Subjective/Other Information                Pt stated that his appetite is


                                                 good and that he eats all of


                                                 his meals.


     Burn                                        Absent


     Trauma                                      Absent


 Nutrition Intervention


     Revisit per MD consult or patient           Sign Off


      request:

## 2019-06-03 NOTE — HEM/ONC PROGRESS NOTE
Assessment and Plan





1.  Lung lesion.


2.  Brain lesion, most likely this is metastatic.  The patient is on steroids 

and Keppra.  Radiation Oncology is consulted.


3.  Right-sided hemiparesis.


4.  History of smoking.  Advised to quit.


5.  History of alcohol usage, advised to quit.


6.  History of hypertension.  The patient at this time is on statin.  I will 

follow the patient during inpatient stay.





d/w IR


pulm evaluating for bronch





getting PT


d/w neurologist


XRT saw pt





- Patient Problems


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


Current Visit: Yes   Status: Acute   





Subjective


Date of service: 06/03/19


Principal diagnosis: lung and brain lesions


Interval history: 





rt sided weakness - getting physical therapy





Objective





- Constitutional


Vitals: 


                                Last Vital Signs











Temp  98.4 F   06/03/19 09:06


 


Pulse  56 L  06/03/19 05:00


 


Resp  18   06/03/19 09:06


 


BP  110/54   06/03/19 09:06


 


Pulse Ox  100   06/03/19 03:36











Pain Intensity (0-10): denies any pain


General appearance: no acute distress


Performance status: 3-limited selfcare





- EENT


Eyes: EOM intact


ENT: hearing intact


Lymph node exam: negative cervical





- Neck


Neck: normal ROM





- Respiratory


Respiratory effort: Positive: normal


Respiratory: bilateral: CTA





- Cardiovascular


Heart Sounds: Present: S1 & S2


Extremities: No edema





- Gastrointestinal


General gastrointestinal: Present: soft, non-tender


Rectal Exam: deferred





- Genitourinary


Male genitourinary: Present: deferred





- Integumentary


Integumentary: warm





- Musculoskeletal


Musculoskeletal: right sided weakness





- Neurologic


Neurologic: focal deficits





- Labs


Lab Results: 


                         Laboratory Results - last 24 hr











  05/30/19 05/30/19





  04:39 04:39


 


Carcinoembryonic Ag   1.1


 


CA 19-9 Antigen  4 














Medications & Allergies





- Medications


Allergies/Adverse Reactions: 


                                    Allergies





No Known Allergies Allergy (Verified 10/19/15 19:57)


   








Active Medications: 














Generic Name Dose Route Start Last Admin





  Trade Name Freq  PRN Reason Stop Dose Admin


 


Acetaminophen  650 mg  05/27/19 04:17 





  Tylenol  PO  





  Q4H PRN  





  Pain MILD(1-3)/Fever >100.5/HA  


 


Atorvastatin Calcium  40 mg  05/27/19 22:00  06/02/19 21:29





  Lipitor  PO   40 mg





  QHS JOSE LUIS   Administration


 


Dexamethasone  4 mg  05/29/19 12:00  06/03/19 05:42





  Decadron  IV   4 mg





  Q6HR JOSE LUIS   Administration


 


Docusate Sodium  100 mg  05/27/19 10:00  06/02/19 21:30





  Colace  PO   100 mg





  BID JOSE LUIS   Administration


 


Hydralazine HCl  10 mg  05/27/19 04:57 





  Apresoline  IV  





  Q4HR PRN  





  Blood Pressure  


 


Levetiracetam  1,000 mg  05/27/19 22:00  06/02/19 21:29





  Keppra  PO   1,000 mg





  BID JOSE LUIS   Administration


 


Ondansetron HCl  4 mg  05/27/19 04:17 





  Zofran  IV  





  Q8H PRN  





  Nausea And Vomiting  


 


Pyridoxine HCl  200 mg  05/27/19 17:00  06/02/19 09:59





  Vitamin B-6  PO   200 mg





  QDAY JOSE LUIS   Administration


 


Sodium Chloride  10 ml  05/27/19 10:00  06/02/19 21:30





  Sodium Chloride Flush Syringe 10 Ml  IV   10 ml





  BID JOSE LUIS   Administration


 


Sodium Chloride  10 ml  05/27/19 04:17 





  Sodium Chloride Flush Syringe 10 Ml  IV  





  PRN PRN  





  LINE FLUSH

## 2019-06-03 NOTE — PROGRESS NOTE
Assessment and Plan





This is  dictating the progress note on the Abraham Llanos. 





Mr. Llanos seems much better today.  He is making eye contact and responding to

my questions.  On examination no new findings except for some improvement in 

strength on the right upper and lower extremities.  Reflexes are increased on 

the right upper and lower extremities with upgoing toe on the right side.  

Physical therapist was present when I came to see the patient and he stated that

he is walking without any assistance using the quad cane.





I have reviewed his MRI done on May 31 which shows minimal improvement as 

compared to the one done a few days ago however there is no worsening and edema 

seems to be less from my reading of the MRI.





Recommend.  There is no contraindication from a neurological standpoint to do 

long biopsy to look for the primary site.











Subjective


Principal diagnosis: lung lesion and brain lesions





Objective





- Vital Sign


                               Vital Signs - 12hr











  06/02/19 06/03/19 06/03/19





  23:40 03:36 05:00


 


Temperature 97.7 F 97.4 F L 


 


Pulse Rate 63 52 L 56 L


 


Respiratory 16 18 





Rate   


 


Blood Pressure 131/64 107/59 


 


O2 Sat by Pulse 100 100 





Oximetry   














  06/03/19





  09:06


 


Temperature 98.4 F


 


Pulse Rate 


 


Respiratory 18





Rate 


 


Blood Pressure 110/54


 


O2 Sat by Pulse 





Oximetry 














- Laboratory Findings


CBC and BMP: 


                                 05/28/19 05:41





                                 05/28/19 05:41


Abnormal Lab Findings: 


                                  Abnormal Labs











  05/27/19 05/28/19 05/28/19





  00:58 05:41 05:41


 


WBC  17.7 H  16.8 H 


 


MCV  81 L  80 L 


 


MCH  27 L  27 L 


 


Plt Count   443 H 


 


Mono % (Auto)   7.9 H 


 


[place] #   1.3 H 


 


Seg Neutrophils %   71.1 H 


 


Seg Neutrophils #   12.0 H 


 


Seg Neutrophils # Man  11.7 H  


 


Monocytes # (Manual)  1.1 H  


 


Eosinophils # (Manual)  0.5 H  


 


Sodium    135 L


 


Creatinine    0.6 L


 


Hemoglobin A1c   


 


Albumin    3.2 L


 


HDL Cholesterol    39 L














  05/28/19





  05:41


 


WBC 


 


MCV 


 


MCH 


 


Plt Count 


 


Mono % (Auto) 


 


Mono # 


 


Seg Neutrophils % 


 


Seg Neutrophils # 


 


Seg Neutrophils # Man 


 


Monocytes # (Manual) 


 


Eosinophils # (Manual) 


 


Sodium 


 


Creatinine 


 


Hemoglobin A1c  6.2 H


 


Albumin 


 


HDL Cholesterol

## 2019-06-04 VITALS — SYSTOLIC BLOOD PRESSURE: 112 MMHG | DIASTOLIC BLOOD PRESSURE: 68 MMHG

## 2019-06-04 LAB
ANISOCYTOSIS BLD QL SMEAR: (no result)
BAND NEUTROPHILS # (MANUAL): 2.7 K/MM3
BUN SERPL-MCNC: 22 MG/DL (ref 9–20)
BUN/CREAT SERPL: 37 %
CALCIUM SERPL-MCNC: 8.9 MG/DL (ref 8.4–10.2)
HCT VFR BLD CALC: 43 % (ref 35.5–45.6)
HEMOLYSIS INDEX: 22
HGB BLD-MCNC: 14.2 GM/DL (ref 11.8–15.2)
MCHC RBC AUTO-ENTMCNC: 33 % (ref 32–34)
MCV RBC AUTO: 80 FL (ref 84–94)
MYELOCYTES # (MANUAL): 0 K/MM3
PLATELET # BLD: 500 K/MM3 (ref 140–440)
PROMYELOCYTES # (MANUAL): 0 K/MM3
RBC # BLD AUTO: 5.38 M/MM3 (ref 3.65–5.03)
TOTAL CELLS COUNTED BLD: 100

## 2019-06-04 RX ADMIN — DEXAMETHASONE SODIUM PHOSPHATE SCH MG: 4 INJECTION, SOLUTION INTRAMUSCULAR; INTRAVENOUS at 00:51

## 2019-06-04 RX ADMIN — DEXAMETHASONE SODIUM PHOSPHATE SCH MG: 4 INJECTION, SOLUTION INTRAMUSCULAR; INTRAVENOUS at 05:40

## 2019-06-04 RX ADMIN — Medication SCH MG: at 09:11

## 2019-06-04 RX ADMIN — DEXAMETHASONE SODIUM PHOSPHATE SCH MG: 4 INJECTION, SOLUTION INTRAMUSCULAR; INTRAVENOUS at 12:29

## 2019-06-04 RX ADMIN — DOCUSATE SODIUM SCH MG: 100 CAPSULE, LIQUID FILLED ORAL at 09:11

## 2019-06-04 RX ADMIN — LEVETIRACETAM SCH MG: 500 TABLET, FILM COATED ORAL at 09:11

## 2019-06-04 RX ADMIN — Medication SCH ML: at 09:14

## 2019-06-04 NOTE — DISCHARGE SUMMARY
Providers





- Providers


Date of Admission: 


05/27/19 04:17





Date of discharge: 06/04/19


Attending physician: 


SUMMER PARSONS





                                        





05/27/19 04:17


Consult to Dietitian/Nutrition [CONS] Routine 


   Physician Instructions: 


   Reason For Exam: 


   Reason for Consult: Nutrition Recommendations


   Reason for Consult: Diet education


Occupational Therapy Evaluate and Treat [CONS] Routine 


   Comment: 


   Reason For Exam: Neuro deficits


Physical Therapy Evaluation and Treat [CONS] Routine 


   Comment: 


   Reason For Exam: Neuro deficits





05/27/19 08:37


Speech Therapy Evaluation and Treat [CONS] Stat 


   Reason For Exam: possible cva/swallow eval





05/27/19 12:02


Consult to Physician [CONS] Routine 


   Comment: 


   Consulting Provider: WAYNE COX


   Physician Instructions: I notified


   Reason For Exam: Evaluate for lung cancer





05/29/19 10:17


Consult to Physician [CONS] Routine 


   Comment: 


   Consulting Provider: TEGAN TALBOT


   Physician Instructions: 


   Reason For Exam: brain mets





05/29/19 22:21


Consult to Physician [CONS] Routine 


   Comment: 


   Consulting Provider: NATHEN ROB


   Physician Instructions: 


   Reason For Exam: brain mets





05/29/19 22:22


Consult to Physician [CONS] Routine 


   Comment: 


   Consulting Provider: BELGICA RODAS


   Physician Instructions: 


   Reason For Exam: lung bx





05/30/19


Consult to Physician [CONS] Routine 


   Comment: 


   Consulting Provider: BELGICA SWIFT


   Physician Instructions: 


   Reason For Exam: Neuro clearance,stroke x [facility]





05/30/19 09:01


Consult to Physician [CONS] Routine 


   Comment: 


   Consulting Provider: [facility]


   Physician Instructions: Dr. James


   Reason For Exam: s/p stroke, edema,lung mass,stable for [facility]





05/30/19 11:35


Consult to Physician [CONS] Routine 


   Comment: 


   Consulting Provider: GRADY JAMES


   Physician Instructions: 


   Reason For Exam: Neuro clearance for bronch





06/03/19 13:44


Consult to Interventional Radiology [CONS] Routine 


   Consulting Provider: BELGICA RODAS


   Reason For Exam: ct guided bx of lung mass


   Place consult to:: vascular


   Notified:: yes


   Was contact made?: Yes


   Time called:: 14:43


   Comment:: Dr Aguilar called the nurse











Primary care physician: 


KYM MOREJON








Hospitalization


Condition: Poor


Hospital course: 


Patient is a 72 yo man with a history of ICH in 2008 secondary to TBI, tobacco 

dependency and alcohol abuse who presents to [facility] ED with c/o altered mental 

status and new right-sided weakness. Old deficits was loss of hearing and 

abnormal speech but no motor deficits per niece Valentina.  CT head without 

contrast revealed Large area of hypoattenuation identified in the upper left 

frontal and temporal lobes, this surrounds a few areas of rounded increased 

attenuation in the high convexity of the posterior left frontal and posterior 

left temporal lobes. The findings may indicate sequelae from subacute to chronic

 infarction. The differential also included primary metastatic tumor in this 

region of the left cerebral hemisphere. Further evaluation with advanced cross-

sectional imaging utilizing MRI revealed multiple left cerebral masses which are

 consistent with metastatic lesions involving the frontoparietal and occipital 

lobes.  There is a small amount of subcutaneous hemorrhage associated with most 

all of the lesions.  Mass effect in the left cerebral hemisphere but no midline 

shift.  No evidence of acute/subacute infarct.  The patient was started on 

Decadron and oncology consulted.  Pulmonary also saw the patient in consultation

 with regards to likely primary lesion with pulmonary nodule. However family 

decided on home with hospice at home. he was therefore discharged home on 

6/4/19.





Total time spent on discharge, 35 mins





Disposition: DC-50 TO HOSPICE (HOME)





- Discharge Diagnoses


(1) Brain metastases


Status: Acute   





(2) Lung cancer


Status: Acute   





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


Status: Acute   





(4) Lung mass


Status: Acute   





Core Measure Documentation





- Palliative Care


Palliative Care/ Comfort Measures: Not Applicable





- Core Measures


Any of the following diagnoses?: none





Exam





- Constitutional


Vitals: 


                                        











Temp Pulse Resp BP Pulse Ox


 


 97.4 F L  50 L  17   112/68   97 


 


 06/04/19 07:30  06/04/19 09:41  06/04/19 08:16  06/04/19 07:30  06/04/19 04:05














Plan


Activity: advance as tolerated


Diet: low fat, low cholesterol


Additional Instructions: 1.Follow up with PCP in 1 week.  2.Continue with 

medical director of Hospice.  3.Steroid tapering dose as ordered


Follow up with: 


KYM MOREJON MD [Primary Care Provider] - 3-5 Days


Prescriptions: 


dexAMETHasone [Dexamethasone] 1 mg PO BID #60 tablet


dexAMETHasone [Dexamethasone] 2 mg PO BID 7 Days #14 tablet


dexAMETHasone [Dexamethasone] 4 mg PO BID 7 Days #14 tablet


levETIRAcetam [Keppra TAB] 1,000 mg PO BID #60 tab


Famotidine [Pepcid] 20 mg PO BID #60 tablet

## 2019-06-04 NOTE — PROGRESS NOTE
Assessment and Plan





74 y/o male with lung mass and mets to brain





1.  Family has elected to do home hospice.  They have chosen [facility] which we 

have already contacted.





Subjective


Date of service: 06/04/19


Principal diagnosis: lung lesion and brain lesions


Interval history: 





Spoke with Niece today and dr. Aguilar spoke with her yesterday.  They have elected

not to do the biopsy.





Objective


                               Vital Signs - 12hr











  06/04/19 06/04/19 06/04/19





  04:05 07:30 08:16


 


Temperature 98.0 F 97.4 F L 


 


Pulse Rate 52 L  


 


Respiratory 16 18 17





Rate   


 


Blood Pressure 107/58 112/68 


 


O2 Sat by Pulse 97  





Oximetry   














  06/04/19





  09:41


 


Temperature 


 


Pulse Rate 50 L


 


Respiratory 





Rate 


 


Blood Pressure 


 


O2 Sat by Pulse 





Oximetry 











Constitutional: no acute distress, alert


ENT: oropharynx moist


Neck: supple


Effort: normal


Ascultation: Bilateral: clear, diminished breath sounds


Percussion: Bilateral: not dull


Tactile fremitus: Bilateral: normal


Cardiovascular: regular rate and rhythm


Integumentary: normal


Extremities: no cyanosis, no cyanosis, no cyanosis


Neurologic: other (right hemiparesis)


CBC and BMP: 


                                 06/04/19 05:13





                                 06/04/19 05:13


ABG, PT/INR, D-dimer: 


PT/INR, D-dimer











PT  14.5 Sec. (12.2-14.9)   05/27/19  00:58    


 


INR  1.06  (0.87-1.13)   05/27/19  00:58    








Abnormal lab findings: 


                                  Abnormal Labs











  05/27/19 05/28/19 05/28/19





  00:58 05:41 05:41


 


WBC  17.7 H  16.8 H 


 


RBC   


 


MCV  81 L  80 L 


 


MCH  27 L  27 L 


 


Plt Count   443 H 


 


Mono % (Auto)   7.9 H 


 


[place] #   1.3 H 


 


Seg Neutrophils %   71.1 H 


 


Seg Neuts % (Manual)   


 


Lymphocytes % (Manual)   


 


Seg Neutrophils #   12.0 H 


 


Seg Neutrophils # Man  11.7 H  


 


Monocytes # (Manual)  1.1 H  


 


Eosinophils # (Manual)  0.5 H  


 


Sodium    135 L


 


BUN   


 


Creatinine    0.6 L


 


Glucose   


 


Hemoglobin A1c   


 


Albumin    3.2 L


 


HDL Cholesterol    39 L














  05/28/19 06/04/19 06/04/19





  05:41 05:13 05:13


 


WBC   29.5 H 


 


RBC   5.38 H 


 


MCV   80 L 


 


MCH   26 L 


 


Plt Count   500 H 


 


Mono % (Auto)   


 


Mono #   


 


Seg Neutrophils %   


 


Seg Neuts % (Manual)   73.0 H 


 


Lymphocytes % (Manual)   11.0 L 


 


Seg Neutrophils #   


 


Seg Neutrophils # Man   21.5 H 


 


Monocytes # (Manual)   2.1 H 


 


Eosinophils # (Manual)   


 


Sodium    133 L


 


BUN    22 H


 


Creatinine    0.6 L


 


Glucose    107 H


 


Hemoglobin A1c  6.2 H  


 


Albumin   


 


HDL Cholesterol

## 2019-06-04 NOTE — PROGRESS NOTE
Hospitalist Physical





- Constitutional


Vitals: 


                                        











Temp Pulse Resp BP Pulse Ox


 


 97.4 F L  50 L  17   112/68   97 


 


 06/04/19 07:30  06/04/19 09:41  06/04/19 08:16  06/04/19 07:30  06/04/19 04:05











General appearance: Present: no acute distress





Results





- Labs


CBC & Chem 7: 


                                 06/04/19 05:13





                                 06/04/19 05:13


Labs: 


                             Laboratory Last Values











WBC  29.5 K/mm3 (4.5-11.0)  H  06/04/19  05:13    


 


RBC  5.38 M/mm3 (3.65-5.03)  H  06/04/19  05:13    


 


Hgb  14.2 gm/dl (11.8-15.2)   06/04/19  05:13    


 


Hct  43.0 % (35.5-45.6)   06/04/19  05:13    


 


MCV  80 fl (84-94)  L  06/04/19  05:13    


 


MCH  26 pg (28-32)  L  06/04/19  05:13    


 


MCHC  33 % (32-34)   06/04/19  05:13    


 


RDW  13.9 % (13.2-15.2)   06/04/19  05:13    


 


Plt Count  500 K/mm3 (140-440)  H  06/04/19  05:13    


 


Lymph % (Auto)  17.7 % (13.4-35.0)   05/28/19  05:41    


 


Mono % (Auto)  7.9 % (0.0-7.3)  H  05/28/19  05:41    


 


Eos % (Auto)  2.6 % (0.0-4.3)   05/28/19  05:41    


 


Baso % (Auto)  0.7 % (0.0-1.8)   05/28/19  05:41    


 


Lymph #  3.0 K/mm3 (1.2-5.4)   05/28/19  05:41    


 


Mono #  1.3 K/mm3 (0.0-0.8)  H  05/28/19  05:41    


 


Eos #  0.4 K/mm3 (0.0-0.4)   05/28/19  05:41    


 


Baso #  0.1 K/mm3 (0.0-0.1)   05/28/19  05:41    


 


Add Manual Diff  Complete   06/04/19  05:13    


 


Total Counted  100   06/04/19  05:13    


 


Seg Neutrophils %  71.1 % (40.0-70.0)  H  05/28/19  05:41    


 


Seg Neuts % (Manual)  73.0 % (40.0-70.0)  H  06/04/19  05:13    


 


  9.0 %  06/04/19  05:13    


 


  11.0 % (13.4-35.0)  L  06/04/19  05:13    


 


Reactive Lymphs % (Man)  0 %  06/04/19  05:13    


 


  7.0 % (0.0-7.3)   06/04/19  05:13    


 


  0 % (0.0-4.3)   06/04/19  05:13    


 


  0 % (0.0-1.8)   06/04/19  05:13    


 


  0 %  06/04/19  05:13    


 


  0 %  06/04/19  05:13    


 


  0 %  06/04/19  05:13    


 


  0 %  06/04/19  05:13    


 


Nucleated RBC %  Not Reportable   06/04/19  05:13    


 


Seg Neutrophils #  12.0 K/mm3 (1.8-7.7)  H  05/28/19  05:41    


 


Seg Neutrophils # Man  21.5 K/mm3 (1.8-7.7)  H  06/04/19  05:13    


 


Band Neutrophils #  2.7 K/mm3  06/04/19  05:13    


 


  3.2 K/mm3 (1.2-5.4)   06/04/19  05:13    


 


Abs React Lymphs (Man)  0.0 K/mm3  06/04/19  05:13    


 


  2.1 K/mm3 (0.0-0.8)  H  06/04/19  05:13    


 


  0.0 K/mm3 (0.0-0.4)   06/04/19  05:13    


 


  0.0 K/mm3 (0.0-0.1)   06/04/19  05:13    


 


  0.0 K/mm3  06/04/19  05:13    


 


  0.0 K/mm3  06/04/19  05:13    


 


  0.0 K/mm3  06/04/19  05:13    


 


Blast Cells #  0.0 K/mm3  06/04/19  05:13    


 


WBC Morphology  Not Reportable   06/04/19  05:13    


 


Hypersegmented Neuts  Not Reportable   06/04/19  05:13    


 


Hyposegmented Neuts  Not Reportable   06/04/19  05:13    


 


Hypogranular Neuts  Not Reportable   06/04/19  05:13    


 


  Not Reportable   06/04/19  05:13    


 


  Not Reportable   06/04/19  05:13    


 


  Not Reportable   06/04/19  05:13    


 


  Not Reportable   06/04/19  05:13    


 


  Not Reportable   06/04/19  05:13    


 


  Not Reportable   06/04/19  05:13    


 


  Appears increased   06/04/19  05:13    


 


  Not Reportable   06/04/19  05:13    


 


Plt Clumps, EDTA  Not Reportable   06/04/19  05:13    


 


  Rare   06/04/19  05:13    


 


  Not Reportable   06/04/19  05:13    


 


  Not Reportable   06/04/19  05:13    


 


Plt Morphology Comment  Not Reportable   06/04/19  05:13    


 


RBC Morphology  Not Reportable   06/04/19  05:13    


 


Dimorphic RBCs  Not Reportable   06/04/19  05:13    


 


  Not Reportable   06/04/19  05:13    


 


  Not Reportable   06/04/19  05:13    


 


  Not Reportable   06/04/19  05:13    


 


  1+   06/04/19  05:13    


 


  Not Reportable   06/04/19  05:13    


 


  Not Reportable   06/04/19  05:13    


 


  Not Reportable   06/04/19  05:13    


 


  Not Reportable   06/04/19  05:13    


 


  Not Reportable   06/04/19  05:13    


 


  Not Reportable   06/04/19  05:13    


 


  Not Reportable   06/04/19  05:13    


 


  Not Reportable   06/04/19  05:13    


 


  Not Reportable   06/04/19  05:13    


 


  Not Reportable   06/04/19  05:13    


 


  Not Reportable   06/04/19  05:13    


 


  Not Reportable   06/04/19  05:13    


 


  Not Reportable   06/04/19  05:13    


 


  Not Reportable   06/04/19  05:13    


 


  Not Reportable   06/04/19  05:13    


 


Acanthocytes (Spur)  Not Reportable   06/04/19  05:13    


 


R[place]  Not Reportable   06/04/19  05:13    


 


  Not Reportable   06/04/19  05:13    


 


  Not Reportable   06/04/19  05:13    


 


  Not Reportable   06/04/19  05:13    


 


  Not Reportable   06/04/19  05:13    


 


Hem Pathologist Commnt  No   06/04/19  05:13    


 


PT  14.5 Sec. (12.2-14.9)   05/27/19  00:58    


 


INR  1.06  (0.87-1.13)   05/27/19  00:58    


 


APTT  36.0 Sec. (24.2-36.6)   05/27/19  00:58    


 


  16.6 Sec. (15.1-19.6)   05/27/19  00:58    


 


Sodium  133 mmol/L (137-145)  L  06/04/19  05:13    


 


Potassium  4.7 mmol/L (3.6-5.0)   06/04/19  05:13    


 


Chloride  98.1 mmol/L ()   06/04/19  05:13    


 


Carbon Dioxide  22 mmol/L (22-30)   06/04/19  05:13    


 


  18 mmol/L  06/04/19  05:13    


 


BUN  22 mg/dL (9-20)  H  06/04/19  05:13    


 


  0.6 mg/dL (0.8-1.5)  L  06/04/19  05:13    


 


Estimated GFR  > 60 ml/min  06/04/19  05:13    


 


  37 %  06/04/19  05:13    


 


Glucose  107 mg/dL ()  H  06/04/19  05:13    


 


POC Glucose  99  ()   05/27/19  21:26    


 


  6.2 % (4-6)  H  05/28/19  05:41    


 


Lactic Acid  1.90 mmol/L (0.7-2.0)   05/27/19  04:35    


 


Calcium  8.9 mg/dL (8.4-10.2)   06/04/19  05:13    


 


  0.30 mg/dL (0.1-1.2)   05/28/19  05:41    


 


AST  14 units/L (5-40)   05/28/19  05:41    


 


ALT  9 units/L (7-56)   05/28/19  05:41    


 


  64 units/L ()   05/28/19  05:41    


 


  < 0.010 ng/mL (0.00-0.029)   05/27/19  00:58    


 


  7.1 g/dL (6.3-8.2)   05/28/19  05:41    


 


  3.2 g/dL (3.9-5)  L  05/28/19  05:41    


 


  0.8 %  05/28/19  05:41    


 


Triglycerides  65 mg/dL (2-149)   05/28/19  05:41    


 


Cholesterol  137 mg/dL ()   05/28/19  05:41    


 


  87 mg/dL ()   05/28/19  05:41    


 


  39 mg/dL (40-59)  L  05/28/19  05:41    


 


  3.51 %  05/28/19  05:41    


 


Carcinoembryonic Ag  1.1 ng/mL (0.0-2.4)   05/30/19  04:39    


 


  4 U/mL (<34)   05/30/19  04:39    














Active Medications





- Current Medications


Current Medications: 














Generic Name Dose Route Start Last Admin





  Trade Name Freq  PRN Reason Stop Dose Admin


 


Acetaminophen  650 mg  05/27/19 04:17 





  Tylenol  PO  





  Q4H PRN  





  Pain MILD(1-3)/Fever >100.5/HA  


 


Atorvastatin Calcium  40 mg  05/27/19 22:00  06/03/19 21:32





  Lipitor  PO   40 mg





  QHS JOSE LUIS   Administration


 


Dexamethasone  4 mg  05/29/19 12:00  06/04/19 05:40





  Decadron  IV   4 mg





  Q6HR JOSE LUIS   Administration


 


Docusate Sodium  100 mg  05/27/19 10:00  06/04/19 09:11





  Colace  PO   100 mg





  BID JOSE LUIS   Administration


 


Hydralazine HCl  10 mg  05/27/19 04:57 





  Apresoline  IV  





  Q4HR PRN  





  Blood Pressure  


 


Levetiracetam  1,000 mg  05/27/19 22:00  06/04/19 09:11





  Keppra  PO   1,000 mg





  BID JOSE LUIS   Administration


 


Ondansetron HCl  4 mg  05/27/19 04:17 





  Zofran  IV  





  Q8H PRN  





  Nausea And Vomiting  


 


Pyridoxine HCl  200 mg  05/27/19 17:00  06/04/19 09:11





  Vitamin B-6  PO   200 mg





  QDAY JOSE LUIS   Administration


 


Sodium Chloride  10 ml  05/27/19 10:00  06/04/19 09:14





  Sodium Chloride Flush Syringe 10 Ml  IV   10 ml





  BID JOSE LUIS   Administration


 


Sodium Chloride  10 ml  05/27/19 04:17 





  Sodium Chloride Flush Syringe 10 Ml  IV  





  PRN PRN  





  LINE FLUSH  














Nutrition/Malnutrition Assess





- Dietary Evaluation


Nutrition/Malnutrition Findings: 


                                        





Nutrition Notes                                            Start:  05/27/19 

15:53


Freq:                                                      Status: Active       




Protocol:                                                                       




 Document     05/31/19 14:56  RM  (Rec: 05/31/19 15:20  RM  [id number])


 Nutrition Notes


     Initial or Follow up                        Brief Note


     Current Diagnosis                           Hypertension


     Other Pertinent Diagnosis                   AMS, ?CVA, Frequent falls,


                                                 Suspected lung cancer, Hx TBI,


                                                 Hx EOTH abuse


     Current Diet                                Cardiac


     Labs/Tests                                  Reviewed


     Pertinent Medications                       Reviewed


     Height                                      5 ft 8 in


     Weight                                      75.5 kg


     Ideal Body Weight (kg)                      70.00


     BMI                                         25.2


     Subjective/Other Information                Pt stated that his appetite is


                                                 good and that he eats all of


                                                 his meals.


     Burn                                        Absent


     Trauma                                      Absent


 Nutrition Intervention


     Revisit per MD consult or patient           Sign Off


      request: